# Patient Record
Sex: FEMALE | Race: WHITE | NOT HISPANIC OR LATINO | Employment: OTHER | URBAN - METROPOLITAN AREA
[De-identification: names, ages, dates, MRNs, and addresses within clinical notes are randomized per-mention and may not be internally consistent; named-entity substitution may affect disease eponyms.]

---

## 2017-05-22 DIAGNOSIS — M81.0 OSTEOPOROSIS: ICD-10-CM

## 2017-05-22 RX ORDER — ALENDRONATE SODIUM 70 MG/1
TABLET ORAL
Qty: 12 TABLET | Refills: 1 | Status: SHIPPED | OUTPATIENT
Start: 2017-05-22 | End: 2017-11-06 | Stop reason: SDUPTHER

## 2017-05-22 NOTE — TELEPHONE ENCOUNTER
----- Message from Bernadette Hunter sent at 5/22/2017  8:29 AM CDT -----  Contact: pt   Pt is requesting a call back from nurse, because she thinks she has posion ivy, pt can be reached at [phone]

## 2017-05-23 ENCOUNTER — LAB VISIT (OUTPATIENT)
Dept: LAB | Facility: HOSPITAL | Age: 67
End: 2017-05-23
Attending: FAMILY MEDICINE
Payer: MEDICARE

## 2017-05-23 ENCOUNTER — OFFICE VISIT (OUTPATIENT)
Dept: INTERNAL MEDICINE | Facility: CLINIC | Age: 67
End: 2017-05-23
Payer: MEDICARE

## 2017-05-23 VITALS
HEART RATE: 71 BPM | HEIGHT: 60 IN | TEMPERATURE: 98 F | BODY MASS INDEX: 26.79 KG/M2 | SYSTOLIC BLOOD PRESSURE: 133 MMHG | WEIGHT: 136.44 LBS | OXYGEN SATURATION: 97 % | RESPIRATION RATE: 16 BRPM | DIASTOLIC BLOOD PRESSURE: 86 MMHG

## 2017-05-23 DIAGNOSIS — M81.0 OSTEOPOROSIS, UNSPECIFIED OSTEOPOROSIS TYPE, UNSPECIFIED PATHOLOGICAL FRACTURE PRESENCE: Primary | ICD-10-CM

## 2017-05-23 DIAGNOSIS — L23.7 ALLERGIC CONTACT DERMATITIS DUE TO PLANTS, EXCEPT FOOD: ICD-10-CM

## 2017-05-23 DIAGNOSIS — Z00.00 ROUTINE HEALTH MAINTENANCE: ICD-10-CM

## 2017-05-23 PROCEDURE — 80053 COMPREHEN METABOLIC PANEL: CPT

## 2017-05-23 PROCEDURE — 99999 PR PBB SHADOW E&M-EST. PATIENT-LVL III: CPT | Mod: PBBFAC,,, | Performed by: FAMILY MEDICINE

## 2017-05-23 PROCEDURE — 36415 COLL VENOUS BLD VENIPUNCTURE: CPT | Mod: PO

## 2017-05-23 PROCEDURE — 99214 OFFICE O/P EST MOD 30 MIN: CPT | Mod: S$PBB,,, | Performed by: FAMILY MEDICINE

## 2017-05-23 NOTE — PROGRESS NOTES
Subjective:       Patient ID: Rissa Garcia is a 66 y.o. female.    Chief Complaint: Annual Exam and Poison Ivy (tomasz arms)    HPI  Here today for routine exam.  Overall she is doing well.  She has a rash on both of her forearms which she thinks is poison ivy.  She has tried different treatments including topical steroids but found that using a topical oatmeal solution has helped the best.  She feels that overall the swelling has gone down and the itching has decreased.  She is up-to-date on all health maintenance issues.  She will be due for her next DEXA scan in the fall of 2018.  She continues to take vitamin D and alendronate.    Family History   Problem Relation Age of Onset    Diabetes Mother     Osteoporosis Mother     Heart disease Father     Diabetes Brother     Sleep apnea Brother     Cataracts Maternal Aunt        Current Outpatient Prescriptions:     alendronate (FOSAMAX) 70 MG tablet, TAKE 1 TABLET ONCE WEEKLY IN THE MORNING WITH A FULL GLASS OF WATER ON AN EMPTY STOMACH. DO NOT LIE DOWN FOR AT LEAST 30 MINUTES AFTERWARDS., Disp: 12 tablet, Rfl: 1    cholecalciferol, vitamin D3, (VITAMIN D3) 1,000 unit Chew, Take 2 tabs daily, Disp: , Rfl:     omeprazole (PRILOSEC) 20 MG capsule, Take 20 mg by mouth., Disp: , Rfl:     Review of Systems   Constitutional: Negative for chills and fever.   HENT: Negative for congestion and sore throat.    Eyes: Negative for visual disturbance.   Respiratory: Negative for cough and shortness of breath.    Cardiovascular: Negative for chest pain.   Gastrointestinal: Negative for abdominal pain, constipation and diarrhea.   Endocrine: Negative for polydipsia and polyuria.   Genitourinary: Negative for difficulty urinating and menstrual problem.   Skin: Positive for rash.   Neurological: Negative for dizziness.   Hematological: Does not bruise/bleed easily.       Objective:   /86 (BP Location: Left arm, Patient Position: Sitting, BP Method: Automatic)   Pulse 71    Temp 97.8 °F (36.6 °C) (Tympanic)   Resp 16   Ht 5' (1.524 m)   Wt 61.9 kg (136 lb 7.4 oz)   SpO2 97%   BMI 26.65 kg/m²      Physical Exam   Constitutional: She is oriented to person, place, and time. She appears well-developed and well-nourished.   HENT:   Head: Normocephalic and atraumatic.   Eyes: Conjunctivae are normal.   Cardiovascular: Normal rate.    Pulmonary/Chest: Effort normal. No respiratory distress.   Musculoskeletal: She exhibits no edema.   Neurological: She is alert and oriented to person, place, and time. Coordination normal.   Skin: Skin is warm and dry. Rash (rash on bilateral forearms consistent with contact dermatitis.) noted.   Psychiatric: She has a normal mood and affect. Her behavior is normal.   Vitals reviewed.      Assessment & Plan     1. Osteoporosis, unspecified osteoporosis type, unspecified pathological fracture presence  Advised her to continue vitamin D supplementation and alendronate.  We'll reassess next year with DEXA scan.  Continue weightbearing exercise as tolerated.    2. Routine health maintenance  Overall she is up-to-date on health maintenance issues.  Getting a CMP to monitor renal function since she is taking alendronate.  - Comprehensive metabolic panel; Future    3. Contact derm:  Advised her to continue taking the same treatment that she is doing with topical oatmeal.  If symptoms worsen she should plan to follow-up

## 2017-05-24 LAB
ALBUMIN SERPL BCP-MCNC: 3.9 G/DL
ALP SERPL-CCNC: 84 U/L
ALT SERPL W/O P-5'-P-CCNC: 31 U/L
ANION GAP SERPL CALC-SCNC: 10 MMOL/L
AST SERPL-CCNC: 26 U/L
BILIRUB SERPL-MCNC: 0.2 MG/DL
BUN SERPL-MCNC: 15 MG/DL
CALCIUM SERPL-MCNC: 9.2 MG/DL
CHLORIDE SERPL-SCNC: 103 MMOL/L
CO2 SERPL-SCNC: 27 MMOL/L
CREAT SERPL-MCNC: 1 MG/DL
EST. GFR  (AFRICAN AMERICAN): >60 ML/MIN/1.73 M^2
EST. GFR  (NON AFRICAN AMERICAN): 58.4 ML/MIN/1.73 M^2
GLUCOSE SERPL-MCNC: 134 MG/DL
POTASSIUM SERPL-SCNC: 4.4 MMOL/L
PROT SERPL-MCNC: 7.6 G/DL
SODIUM SERPL-SCNC: 140 MMOL/L

## 2017-05-29 ENCOUNTER — TELEPHONE (OUTPATIENT)
Dept: INTERNAL MEDICINE | Facility: CLINIC | Age: 67
End: 2017-05-29

## 2017-05-29 DIAGNOSIS — R73.09 ELEVATED GLUCOSE: Primary | ICD-10-CM

## 2017-05-29 NOTE — TELEPHONE ENCOUNTER
----- Message from Jerome Steel DO sent at 5/29/2017  9:28 AM CDT -----  Glucose level a bit elevated. Would like to repeat testing with hemoglobin A1c. Lab ordered

## 2017-06-05 ENCOUNTER — LAB VISIT (OUTPATIENT)
Dept: LAB | Facility: HOSPITAL | Age: 67
End: 2017-06-05
Attending: INTERNAL MEDICINE
Payer: MEDICARE

## 2017-06-05 DIAGNOSIS — R73.09 ELEVATED GLUCOSE: ICD-10-CM

## 2017-06-05 PROCEDURE — 83036 HEMOGLOBIN GLYCOSYLATED A1C: CPT

## 2017-06-05 PROCEDURE — 36415 COLL VENOUS BLD VENIPUNCTURE: CPT | Mod: PO

## 2017-06-06 ENCOUNTER — TELEPHONE (OUTPATIENT)
Dept: INTERNAL MEDICINE | Facility: CLINIC | Age: 67
End: 2017-06-06

## 2017-06-06 PROBLEM — R73.03 PREDIABETES: Status: ACTIVE | Noted: 2017-06-06

## 2017-06-06 LAB
ESTIMATED AVG GLUCOSE: 134 MG/DL
HBA1C MFR BLD HPLC: 6.3 %

## 2017-06-06 NOTE — TELEPHONE ENCOUNTER
----- Message from Jerome Steel DO sent at 6/6/2017  2:27 PM CDT -----  Labs in prediabetic range.

## 2017-06-06 NOTE — TELEPHONE ENCOUNTER
Patient notified of result and express understanding. Would like to have some recommendation as to what she can do to help. Please advise

## 2017-06-08 ENCOUNTER — TELEPHONE (OUTPATIENT)
Dept: INTERNAL MEDICINE | Facility: CLINIC | Age: 67
End: 2017-06-08

## 2017-06-08 NOTE — TELEPHONE ENCOUNTER
Please mail or email her the following information:    Prediabetes    [email or web address]  Overview    What is prediabetes?    Before people develop type 2 diabetes, they usually have prediabetes. In people who have prediabetes, blood sugar levels are higher than normal but not high enough to say they have diabetes. Normal fasting blood sugar is between 70 and 99 mg per dL  . Fasting blood sugar is your blood sugar level you before you have something to eat in the morning. Fasting blood sugar between 100 and 125 mg per dL suggests prediabetes. Fasting blood sugar higher than 126 mg per dL is considered diabetes. People   who have prediabetes have a high risk of eventually developing diabetes.    Causes    Who is at risk?    You are at risk for prediabetes if any of the following are true:    You are overweight or obese.  You have a parent, brother or sister who has diabetes.  You had diabetes during pregnancy (called gestational diabetes) or had a baby who weighed more than 9 pounds at birth.  You belong to any of the following ethnic groups: , ,  or /.  You have high blood pressure (above 140/90 mm Hg).  Your high-density lipoprotein (HDL) cholesterol level (good cholesterol) is less than 40 mg per dL (for men) or less than 50 mg per dl (for women), or your triglyceride level is higher than 250 mg per dL.  You are a woman who has polycystic ovary syndrome (PCOS).  Diagnosis    How can my doctor tell if I have prediabetes?    Your doctor can give you a blood test to check for prediabetes.    Prevention    If I have prediabetes, can I avoid developing diabetes?    You can lower your risk of developing diabetes by making changes in your lifestyle. If you are overweight, losing weight can help. Losing weight also helps lower your blood pressure and cholesterol levels.    Exercise is also important. Your exercise routine should  include 30 minutes of moderate physical activity (such as brisk walking or swimming) at least 5 times a week. Ask your doctor what exercise level is safe for you.    Follow a healthy diet. Eat foods such as vegetables, fruits, whole grains, fish, beans, poultry and other meats. Dont eat a lot of processed foods or sweeteners such as sugar, honey, maple syrup, agave syrup, or molasses. Eat foods made with whole g  rains instead of white flour.    Your doctor might refer you to a dietitian or diabetes educator to help you change your eating and exercise habits.    Treatment    Can medicine help prevent or delay diabetes?    Diabetes medicines are not as effective as diet and exercise. However, your doctor might prescribe medicine if you are at high risk for diabetes and have other medical problems, such as obesity, a high triglyceride level, a low HDL cholesterol level   or high blood pressure.    Questions    Questions to Ask Your Doctor    If I have prediabetes, will I get diabetes?  What is the best step I can take to avoid getting diabetes?  My father has diabetes. Should I be screened for prediabetes on a regular basis?   I have diabetes. Should I have my children screened for prediabetes?  I had gestational diabetes. Should I be screened for prediabetes regularly?  Are there any foods I should eat that will help me to avoid prediabetes?  Should I speak with a dietitian about changing what I eat?  Other organizations    CDC Diabetes Public Health Resource  National Diabetes Education Program  American Diabetes Association  National Diabetes Information Clearinghouse  Resources    Impaired Glucose Tolerance and Impaired Fasting Glucose by Yomaira Moreira M.D., Flavio Curtis M.D., and Sydnie Ivey M.D.( 04/15/04, http://www.aafp.org/[email or web address].html)  © 2017 American Academy [employer]

## 2017-07-05 RX ORDER — OMEPRAZOLE 20 MG/1
20 CAPSULE, DELAYED RELEASE ORAL DAILY
Qty: 90 CAPSULE | Refills: 1 | Status: SHIPPED | OUTPATIENT
Start: 2017-07-05 | End: 2018-07-10 | Stop reason: SDUPTHER

## 2017-11-06 DIAGNOSIS — M81.0 OSTEOPOROSIS: ICD-10-CM

## 2017-11-07 RX ORDER — ALENDRONATE SODIUM 70 MG/1
TABLET ORAL
Qty: 12 TABLET | Refills: 1 | Status: SHIPPED | OUTPATIENT
Start: 2017-11-07 | End: 2018-04-24 | Stop reason: SDUPTHER

## 2018-01-08 ENCOUNTER — LAB VISIT (OUTPATIENT)
Dept: LAB | Facility: HOSPITAL | Age: 68
End: 2018-01-08
Attending: FAMILY MEDICINE
Payer: MEDICARE

## 2018-01-08 ENCOUNTER — OFFICE VISIT (OUTPATIENT)
Dept: INTERNAL MEDICINE | Facility: CLINIC | Age: 68
End: 2018-01-08
Payer: MEDICARE

## 2018-01-08 VITALS
OXYGEN SATURATION: 98 % | WEIGHT: 137.13 LBS | SYSTOLIC BLOOD PRESSURE: 130 MMHG | HEART RATE: 88 BPM | DIASTOLIC BLOOD PRESSURE: 90 MMHG | BODY MASS INDEX: 26.92 KG/M2 | RESPIRATION RATE: 18 BRPM | HEIGHT: 60 IN | TEMPERATURE: 97 F

## 2018-01-08 DIAGNOSIS — R73.03 PREDIABETES: Primary | ICD-10-CM

## 2018-01-08 DIAGNOSIS — R73.03 PREDIABETES: ICD-10-CM

## 2018-01-08 DIAGNOSIS — M81.0 OSTEOPOROSIS, UNSPECIFIED OSTEOPOROSIS TYPE, UNSPECIFIED PATHOLOGICAL FRACTURE PRESENCE: ICD-10-CM

## 2018-01-08 DIAGNOSIS — J32.9 RHINOSINUSITIS: ICD-10-CM

## 2018-01-08 DIAGNOSIS — Z12.11 COLON CANCER SCREENING: ICD-10-CM

## 2018-01-08 LAB
ANION GAP SERPL CALC-SCNC: 9 MMOL/L
BUN SERPL-MCNC: 14 MG/DL
CALCIUM SERPL-MCNC: 9.1 MG/DL
CHLORIDE SERPL-SCNC: 106 MMOL/L
CO2 SERPL-SCNC: 27 MMOL/L
CREAT SERPL-MCNC: 0.9 MG/DL
EST. GFR  (AFRICAN AMERICAN): >60 ML/MIN/1.73 M^2
EST. GFR  (NON AFRICAN AMERICAN): >60 ML/MIN/1.73 M^2
GLUCOSE SERPL-MCNC: 124 MG/DL
POTASSIUM SERPL-SCNC: 3.9 MMOL/L
SODIUM SERPL-SCNC: 142 MMOL/L

## 2018-01-08 PROCEDURE — 99213 OFFICE O/P EST LOW 20 MIN: CPT | Mod: PBBFAC,PO | Performed by: FAMILY MEDICINE

## 2018-01-08 PROCEDURE — 99999 PR PBB SHADOW E&M-EST. PATIENT-LVL III: CPT | Mod: PBBFAC,,, | Performed by: FAMILY MEDICINE

## 2018-01-08 PROCEDURE — 36415 COLL VENOUS BLD VENIPUNCTURE: CPT | Mod: PO

## 2018-01-08 PROCEDURE — 99214 OFFICE O/P EST MOD 30 MIN: CPT | Mod: S$PBB,,, | Performed by: FAMILY MEDICINE

## 2018-01-08 PROCEDURE — 83036 HEMOGLOBIN GLYCOSYLATED A1C: CPT

## 2018-01-08 PROCEDURE — 80048 BASIC METABOLIC PNL TOTAL CA: CPT | Mod: PO

## 2018-01-08 NOTE — ASSESSMENT & PLAN NOTE
Last hemoglobin A1c was about 6.3 in June 2016.  If levels are the same or more elevated would recommend starting on metformin.

## 2018-01-08 NOTE — ASSESSMENT & PLAN NOTE
Recommended using cetirizine and Flonase on a daily basis as well as eustachian tube exercises.  If symptoms are not improved towards the end of the week would recommend a Medrol Dosepak

## 2018-01-08 NOTE — PROGRESS NOTES
Subjective:       Patient ID: Rissa Garcia is a 67 y.o. female.    Chief Complaint: Nasal Congestion; Ear Fullness (right); and Cough    HPI  Here today complaining of right-sided ear congestion without any pain.  She started having these symptoms about a week ago prior to that she did have significant upper respiratory type infection for a couple weeks those surgery with a lot of sneezing and some coughing.  She has not taken any ALLERGY tablets on a consistent basis and is not using Flonase.  She has tried some over-the-counter nasal spray which helps somewhat with her nasal congestion but has not helped with the congestion.  She does not have any drainage from ears.  Does have hearing nasal both sides.    We also discussed some health maintenance today.  She will be due for follow-up mammogram later this year and also bone density screening.  She continues to take alendronate as well as calcium and vitamin D.    she has found herself to be somewhat dependent on taking omeprazole on a routine basis.  She takes it every other day and she has noticed that if she doesn't take it for a few days she will start having symptoms.  She has never had an upper endoscopy.  Has also never had colonoscopy but in the past has done fecal occult blood testing.    Family History   Problem Relation Age of Onset    Diabetes Mother     Osteoporosis Mother     Heart disease Father     Diabetes Brother     Sleep apnea Brother     Cataracts Maternal Aunt        Current Outpatient Prescriptions:     alendronate (FOSAMAX) 70 MG tablet, TAKE 1 TABLET ONCE WEEKLY IN THE MORNING WITH A FULL GLASS OF WATER ON AN EMPTY STOMACH. DO NOT LIE DOWN FOR AT LEAST 30 MINUTES AFTERWARDS., Disp: 12 tablet, Rfl: 1    cholecalciferol, vitamin D3, (VITAMIN D3) 1,000 unit Chew, Take 2 tabs daily, Disp: , Rfl:     omeprazole (PRILOSEC) 20 MG capsule, Take 1 capsule (20 mg total) by mouth once daily., Disp: 90 capsule, Rfl: 1    Review of Systems    Constitutional: Negative for chills and fever.   HENT: Positive for ear pain (no ear pain but having congestion on the right side.). Negative for congestion.    Respiratory: Negative for cough and shortness of breath.    Cardiovascular: Negative for chest pain.   Gastrointestinal: Negative for abdominal pain.   Skin: Negative for rash.   Neurological: Negative for dizziness.       Objective:   BP (!) 130/90 (BP Location: Right arm, Patient Position: Sitting, BP Method: Large (Manual))   Pulse 88   Temp 96.7 °F (35.9 °C) (Tympanic)   Resp 18   Ht 5' (1.524 m)   Wt 62.2 kg (137 lb 2 oz)   SpO2 98%   BMI 26.78 kg/m²      Physical Exam   Constitutional: She is oriented to person, place, and time. She appears well-developed and well-nourished.   HENT:   Head: Normocephalic and atraumatic.   Does seem to have fluid of her right middle ear.  There is some cerumen but it is not impacted.  More cerumen on the left side.  No signs of erythema of tympanic membrane   Eyes: Conjunctivae are normal.   Cardiovascular: Normal rate.    Pulmonary/Chest: Effort normal. No respiratory distress.   Musculoskeletal: She exhibits no edema.   Neurological: She is alert and oriented to person, place, and time. Coordination normal.   Skin: Skin is warm and dry. No rash noted.   Psychiatric: She has a normal mood and affect. Her behavior is normal.   Vitals reviewed.      Assessment & Plan     Problem List Items Addressed This Visit        ENT    Rhinosinusitis    Current Assessment & Plan     Recommended using cetirizine and Flonase on a daily basis as well as eustachian tube exercises.  If symptoms are not improved towards the end of the week would recommend a Medrol Dosepak            Endocrine    Prediabetes - Primary    Current Assessment & Plan     Last hemoglobin A1c was about 6.3 in June 2016.  If levels are the same or more elevated would recommend starting on metformin.         Relevant Orders    Hemoglobin A1c    Basic  metabolic panel       GI    Colon cancer screening    Current Assessment & Plan     Opted to do fit         Relevant Orders    Fecal Immunochemical Test (iFOBT)       Orthopedic    Osteoporosis    Current Assessment & Plan     Will be due for DEXA scan later this year.  She continues to take alendronate.                 No Follow-up on file.

## 2018-01-09 LAB
ESTIMATED AVG GLUCOSE: 120 MG/DL
HBA1C MFR BLD HPLC: 5.8 %

## 2018-01-11 ENCOUNTER — APPOINTMENT (OUTPATIENT)
Dept: LAB | Facility: HOSPITAL | Age: 68
End: 2018-01-11
Attending: FAMILY MEDICINE
Payer: MEDICARE

## 2018-01-11 PROCEDURE — 82274 ASSAY TEST FOR BLOOD FECAL: CPT

## 2018-03-05 ENCOUNTER — OFFICE VISIT (OUTPATIENT)
Dept: INTERNAL MEDICINE | Facility: CLINIC | Age: 68
End: 2018-03-05
Payer: MEDICARE

## 2018-03-05 VITALS
DIASTOLIC BLOOD PRESSURE: 86 MMHG | OXYGEN SATURATION: 98 % | HEIGHT: 60 IN | BODY MASS INDEX: 26.84 KG/M2 | RESPIRATION RATE: 16 BRPM | HEART RATE: 73 BPM | WEIGHT: 136.69 LBS | SYSTOLIC BLOOD PRESSURE: 120 MMHG | TEMPERATURE: 97 F

## 2018-03-05 DIAGNOSIS — L30.9 ECZEMA, UNSPECIFIED TYPE: Primary | ICD-10-CM

## 2018-03-05 PROBLEM — J32.9 RHINOSINUSITIS: Status: RESOLVED | Noted: 2018-01-08 | Resolved: 2018-03-05

## 2018-03-05 PROCEDURE — 99999 PR PBB SHADOW E&M-EST. PATIENT-LVL IV: CPT | Mod: PBBFAC,,, | Performed by: NURSE PRACTITIONER

## 2018-03-05 PROCEDURE — 99214 OFFICE O/P EST MOD 30 MIN: CPT | Mod: S$PBB,,, | Performed by: NURSE PRACTITIONER

## 2018-03-05 PROCEDURE — 99214 OFFICE O/P EST MOD 30 MIN: CPT | Mod: PBBFAC,PO | Performed by: NURSE PRACTITIONER

## 2018-03-05 RX ORDER — FLUOCINONIDE 0.5 MG/G
CREAM TOPICAL 2 TIMES DAILY
COMMUNITY
End: 2018-03-05 | Stop reason: SDUPTHER

## 2018-03-05 RX ORDER — FLUOCINONIDE 0.5 MG/G
CREAM TOPICAL 2 TIMES DAILY
Qty: 120 G | Refills: 1 | Status: SHIPPED | OUTPATIENT
Start: 2018-03-05 | End: 2020-08-25 | Stop reason: SDUPTHER

## 2018-03-05 RX ORDER — TRIAMCINOLONE ACETONIDE 1 MG/G
CREAM TOPICAL 2 TIMES DAILY
COMMUNITY
End: 2020-08-25 | Stop reason: ALTCHOICE

## 2018-03-05 NOTE — PROGRESS NOTES
Subjective:       Patient ID: Rissa Garcia is a 67 y.o. female.     Chief Complaint: Rash (itching)     Rash   This is a chronic (usually occurs at the same time each year) problem. The current episode started more than 1 year ago (20 years ago). The problem has been waxing and waning since onset. The affected locations include the left hand, left wrist, right hand and right wrist. The rash is characterized by blistering, pain, itchiness and swelling. Pertinent negatives include no congestion, cough, fatigue, fever, joint pain, rhinorrhea, shortness of breath, sore throat or vomiting. Past treatments include anti-itch cream, antibiotic cream and topical steroids. The treatment provided mild (helps for a few hours then needs to re-apply) relief. Her past medical history is significant for eczema and varicella. There is no history of allergies or asthma.      Review of Systems   Constitutional: Negative for fatigue and fever.   HENT: Negative for congestion, rhinorrhea and sore throat.    Eyes: Negative.    Respiratory: Negative for cough and shortness of breath.    Cardiovascular: Negative for chest pain, palpitations and leg swelling.   Gastrointestinal: Negative.  Negative for vomiting.   Musculoskeletal: Negative for arthralgias, joint pain, joint swelling and myalgias.   Skin: Positive for rash.   Neurological: Negative.    Psychiatric/Behavioral: Negative.        Objective:   Physical Exam   Constitutional: She is oriented to person, place, and time. She appears well-developed and well-nourished.   HENT:   Head: Normocephalic.   Eyes: EOM are normal. Pupils are equal, round, and reactive to light.   Neck: Normal range of motion.   Cardiovascular: Normal rate, regular rhythm and normal heart sounds.    Pulmonary/Chest: Effort normal and breath sounds normal.   Abdominal: Soft.   Musculoskeletal: Normal range of motion.   Neurological: She is alert and oriented to person, place, and time.   Skin: Skin is warm and  dry. Rash noted. Rash is papular.   Small areas of raised, reddened bumps over both hands, and between fingers   Psychiatric: She has a normal mood and affect. Her behavior is normal. Judgment and thought content normal.       Assessment:       1. Eczema, unspecified type        Plan:           Problem List Items Addressed This Visit      Eczema - Primary     Current Assessment & Plan       Avoid over drying hands with soap. Discard  steroid cream  If not improving over next 10 days will refer to derm           Relevant Medications     fluocinonide 0.05% (LIDEX) 0.05 % cream

## 2018-03-05 NOTE — ASSESSMENT & PLAN NOTE
Avoid over drying hands with soap. Discard  steroid cream  If not improving over next 10 days will refer to derm

## 2018-04-24 DIAGNOSIS — M81.0 OSTEOPOROSIS: ICD-10-CM

## 2018-04-24 RX ORDER — ALENDRONATE SODIUM 70 MG/1
TABLET ORAL
Qty: 12 TABLET | Refills: 1 | Status: SHIPPED | OUTPATIENT
Start: 2018-04-24 | End: 2018-11-30 | Stop reason: SDUPTHER

## 2018-07-10 RX ORDER — OMEPRAZOLE 20 MG/1
20 CAPSULE, DELAYED RELEASE ORAL DAILY
Qty: 90 CAPSULE | Refills: 1 | Status: SHIPPED | OUTPATIENT
Start: 2018-07-10 | End: 2019-01-16 | Stop reason: SDUPTHER

## 2018-09-11 ENCOUNTER — OFFICE VISIT (OUTPATIENT)
Dept: INTERNAL MEDICINE | Facility: CLINIC | Age: 68
End: 2018-09-11
Payer: MEDICARE

## 2018-09-11 ENCOUNTER — HOSPITAL ENCOUNTER (OUTPATIENT)
Dept: RADIOLOGY | Facility: HOSPITAL | Age: 68
Discharge: HOME | End: 2018-09-11
Attending: FAMILY MEDICINE
Payer: MEDICARE

## 2018-09-11 VITALS
SYSTOLIC BLOOD PRESSURE: 128 MMHG | DIASTOLIC BLOOD PRESSURE: 74 MMHG | BODY MASS INDEX: 26.32 KG/M2 | WEIGHT: 134.06 LBS | HEART RATE: 80 BPM | RESPIRATION RATE: 18 BRPM | TEMPERATURE: 97 F | HEIGHT: 60 IN | OXYGEN SATURATION: 98 %

## 2018-09-11 DIAGNOSIS — S16.1XXA STRAIN OF NECK MUSCLE, INITIAL ENCOUNTER: ICD-10-CM

## 2018-09-11 DIAGNOSIS — Z12.31 ENCOUNTER FOR SCREENING MAMMOGRAM FOR BREAST CANCER: ICD-10-CM

## 2018-09-11 DIAGNOSIS — K21.9 GASTROESOPHAGEAL REFLUX DISEASE, ESOPHAGITIS PRESENCE NOT SPECIFIED: ICD-10-CM

## 2018-09-11 DIAGNOSIS — M81.0 OSTEOPOROSIS, UNSPECIFIED OSTEOPOROSIS TYPE, UNSPECIFIED PATHOLOGICAL FRACTURE PRESENCE: Primary | ICD-10-CM

## 2018-09-11 PROCEDURE — 77067 SCR MAMMO BI INCL CAD: CPT | Mod: 26,,, | Performed by: RADIOLOGY

## 2018-09-11 PROCEDURE — 99215 OFFICE O/P EST HI 40 MIN: CPT | Mod: PBBFAC,PO | Performed by: FAMILY MEDICINE

## 2018-09-11 PROCEDURE — 99214 OFFICE O/P EST MOD 30 MIN: CPT | Mod: S$PBB,,, | Performed by: FAMILY MEDICINE

## 2018-09-11 PROCEDURE — 77067 SCR MAMMO BI INCL CAD: CPT | Mod: TC,PO

## 2018-09-11 PROCEDURE — 99999 PR PBB SHADOW E&M-EST. PATIENT-LVL V: CPT | Mod: PBBFAC,,, | Performed by: FAMILY MEDICINE

## 2018-09-11 PROCEDURE — 77063 BREAST TOMOSYNTHESIS BI: CPT | Mod: 26,,, | Performed by: RADIOLOGY

## 2018-09-11 NOTE — ASSESSMENT & PLAN NOTE
Recommended minimizing the use of omeprazole as best she can.  We discussed the potential issues with osteoporosis and calcium loss with this.

## 2018-09-11 NOTE — ASSESSMENT & PLAN NOTE
Recommended continue doing stretching and range-of-motion exercises.  May use Aleve a couple times a day for the next 4-5 days to see if this helps.

## 2018-09-11 NOTE — MEDICAL/APP STUDENT
Subjective:       Patient ID: Rissa Garcia is a 68 y.o. female.    Chief Complaint: Neck Pain    Neck Pain    This is a new problem. The current episode started in the past 7 days (Wednesday). The problem occurs intermittently. The problem has been waxing and waning (resolved yesterday, returned this morning). Associated with: cutting limbs and working in yard. The pain is present in the left side and right side (left side with turning head, R side not related to movement). The quality of the pain is described as aching. The pain is at a severity of 5/10. The pain is moderate. The symptoms are aggravated by position (leaning forward). Pertinent negatives include no headaches, numbness, tingling or weakness. She has tried heat, ice, NSAIDs and acetaminophen for the symptoms. The treatment provided mild relief.       Family History   Problem Relation Age of Onset    Diabetes Mother     Osteoporosis Mother     Heart disease Father     Diabetes Brother     Sleep apnea Brother     Cataracts Maternal Aunt        Current Outpatient Medications:     alendronate (FOSAMAX) 70 MG tablet, TAKE 1 TABLET ONCE WEEKLY IN THE MORNING WITH A FULL GLASS OF WATER ON AN EMPTY STOMACH. DO NOT LIE DOWN FOR AT LEAST 30 MINUTES AFTERWARDS., Disp: 12 tablet, Rfl: 1    cholecalciferol, vitamin D3, (VITAMIN D3) 1,000 unit Chew, Take 2 tabs daily, Disp: , Rfl:     fluocinonide 0.05% (LIDEX) 0.05 % cream, Apply topically 2 (two) times daily., Disp: 120 g, Rfl: 1    omeprazole (PRILOSEC) 20 MG capsule, Take 1 capsule (20 mg total) by mouth once daily., Disp: 90 capsule, Rfl: 1    triamcinolone acetonide 0.1% (KENALOG) 0.1 % cream, Apply topically 2 (two) times daily., Disp: , Rfl:     Review of Systems   Respiratory: Negative.    Cardiovascular: Negative.    Musculoskeletal: Positive for neck pain. Negative for gait problem and neck stiffness.        Pain does not radiate. R trapezius aching.    Neurological: Negative for tingling,  weakness, numbness and headaches.       Objective:   /74 (BP Location: Right arm, Patient Position: Sitting, BP Method: Medium (Automatic))   Pulse 80   Temp 96.6 °F (35.9 °C) (Tympanic)   Resp 18   Ht 5' (1.524 m)   Wt 60.8 kg (134 lb 0.6 oz)   SpO2 98%   BMI 26.18 kg/m²      Physical Exam   Constitutional: She appears well-developed and well-nourished. No distress.   Neck: Normal range of motion.   Negative for tenderness with palpation.   Cardiovascular: Normal rate and regular rhythm.   Skin: She is not diaphoretic.       Assessment & Plan     Problem List Items Addressed This Visit     Osteoporosis - Primary            No Follow-up on file.

## 2018-09-11 NOTE — PROGRESS NOTES
Subjective:       Patient ID: Rissa Garcia is a 68 y.o. female.    Chief Complaint: Neck Pain    HPI  Here today with right-sided neck pain and tightness.  One week ago she was working with a chainsaw doing a lot overhead work in since that time has been having pain, intermittent spasms of her right trapezius which is on top of a chronic problem that she has had for many years where she will intermittently have a spasm of her right trapezius causing her shoulder to elevated toward her ear.  She does not have any radiation of pain down her arms and never has had that problem.  For this acute episode since cutting a lot of limbs she has been using some Tylenol and naproxen intermittently without much relief.  Also has tried heat and cold compresses which also have not helped very much.    Family History   Problem Relation Age of Onset    Diabetes Mother     Osteoporosis Mother     Heart disease Father     Diabetes Brother     Sleep apnea Brother     Cataracts Maternal Aunt        Current Outpatient Medications:     alendronate (FOSAMAX) 70 MG tablet, TAKE 1 TABLET ONCE WEEKLY IN THE MORNING WITH A FULL GLASS OF WATER ON AN EMPTY STOMACH. DO NOT LIE DOWN FOR AT LEAST 30 MINUTES AFTERWARDS., Disp: 12 tablet, Rfl: 1    cholecalciferol, vitamin D3, (VITAMIN D3) 1,000 unit Chew, Take 2 tabs daily, Disp: , Rfl:     fluocinonide 0.05% (LIDEX) 0.05 % cream, Apply topically 2 (two) times daily., Disp: 120 g, Rfl: 1    omeprazole (PRILOSEC) 20 MG capsule, Take 1 capsule (20 mg total) by mouth once daily., Disp: 90 capsule, Rfl: 1    triamcinolone acetonide 0.1% (KENALOG) 0.1 % cream, Apply topically 2 (two) times daily., Disp: , Rfl:     Review of Systems   Constitutional: Negative for chills and fever.   Respiratory: Negative for cough and shortness of breath.    Cardiovascular: Negative for chest pain.   Gastrointestinal: Negative for abdominal pain.   Musculoskeletal: Positive for neck pain.   Skin: Negative for  rash.   Neurological: Negative for dizziness.       Objective:   /74 (BP Location: Right arm, Patient Position: Sitting, BP Method: Medium (Automatic))   Pulse 80   Temp 96.6 °F (35.9 °C) (Tympanic)   Resp 18   Ht 5' (1.524 m)   Wt 60.8 kg (134 lb 0.6 oz)   SpO2 98%   BMI 26.18 kg/m²      Physical Exam   Constitutional: She is oriented to person, place, and time. She appears well-developed and well-nourished.   HENT:   Head: Normocephalic and atraumatic.   Eyes: Conjunctivae are normal.   Cardiovascular: Normal rate.   Pulmonary/Chest: Effort normal. No respiratory distress.   Musculoskeletal: She exhibits no edema.   Decreased range of motion of her cervical spine.  Had some tenderness of the trapezius muscle.   Neurological: She is alert and oriented to person, place, and time. Coordination normal.   Skin: Skin is warm and dry. No rash noted.   Psychiatric: She has a normal mood and affect. Her behavior is normal.   Vitals reviewed.      Assessment & Plan     Problem List Items Addressed This Visit        Renal/    Encounter for screening mammogram for breast cancer    Current Assessment & Plan     Due for repeat mammogram.         Relevant Orders    Mammo Digital Screening Bilat with CAD       GI    Acid reflux    Current Assessment & Plan     Recommended minimizing the use of omeprazole as best she can.  We discussed the potential issues with osteoporosis and calcium loss with this.            Orthopedic    Osteoporosis - Primary    Current Assessment & Plan     Recommend that she continues on the alendronate for now.  Due for repeat DEXA scan.         Relevant Orders    DXA Bone Density Spine And Hip    Cervical strain    Current Assessment & Plan     Recommended continue doing stretching and range-of-motion exercises.  May use Aleve a couple times a day for the next 4-5 days to see if this helps.         Relevant Orders    Ambulatory Referral to Physiatry        She declined flu shot  today.    No Follow-up on file.

## 2018-10-03 ENCOUNTER — APPOINTMENT (OUTPATIENT)
Dept: RADIOLOGY | Facility: HOSPITAL | Age: 68
End: 2018-10-03
Attending: FAMILY MEDICINE
Payer: MEDICARE

## 2018-10-03 ENCOUNTER — INITIAL CONSULT (OUTPATIENT)
Dept: PHYSICAL MEDICINE AND REHAB | Facility: CLINIC | Age: 68
End: 2018-10-03
Payer: MEDICARE

## 2018-10-03 VITALS
DIASTOLIC BLOOD PRESSURE: 87 MMHG | HEIGHT: 60 IN | BODY MASS INDEX: 26.31 KG/M2 | SYSTOLIC BLOOD PRESSURE: 156 MMHG | WEIGHT: 134 LBS | HEART RATE: 67 BPM | RESPIRATION RATE: 14 BRPM

## 2018-10-03 DIAGNOSIS — R29.898 ARM WEAKNESS: ICD-10-CM

## 2018-10-03 DIAGNOSIS — M81.0 OSTEOPOROSIS, UNSPECIFIED OSTEOPOROSIS TYPE, UNSPECIFIED PATHOLOGICAL FRACTURE PRESENCE: ICD-10-CM

## 2018-10-03 DIAGNOSIS — M79.18 MYOFASCIAL PAIN: Primary | ICD-10-CM

## 2018-10-03 PROCEDURE — 99204 OFFICE O/P NEW MOD 45 MIN: CPT | Mod: S$PBB,,, | Performed by: PHYSICAL MEDICINE & REHABILITATION

## 2018-10-03 PROCEDURE — 99999 PR PBB SHADOW E&M-EST. PATIENT-LVL III: CPT | Mod: PBBFAC,,, | Performed by: PHYSICAL MEDICINE & REHABILITATION

## 2018-10-03 PROCEDURE — 77080 DXA BONE DENSITY AXIAL: CPT | Mod: 26,,, | Performed by: RADIOLOGY

## 2018-10-03 PROCEDURE — 77080 DXA BONE DENSITY AXIAL: CPT | Mod: TC,PO

## 2018-10-03 PROCEDURE — 99213 OFFICE O/P EST LOW 20 MIN: CPT | Mod: PBBFAC,25,PO | Performed by: PHYSICAL MEDICINE & REHABILITATION

## 2018-10-03 RX ORDER — FLUOCINONIDE CREAM (EMULSIFIED BASE) 0.5 MG/G
CREAM TOPICAL
COMMUNITY
Start: 2014-02-04

## 2018-10-03 NOTE — PATIENT INSTRUCTIONS
Myofascial Pain Syndrome: Fibrositis  Your pain is caused by a state of chronic muscle tension. This condition is called by various names: myofascial pain, fibrositis and trigger point pain. This can also be due to mechanical stress (such as working at a computer terminal for long periods; or work that requires repetitive motions of the arms or hands) or emotional stress (such as problems on the job or in your personal life). Sometimes there is no obvious cause. The pain can occur in the area of the muscle spasm or at a site distant to it. For example, spasm of a neck muscle can cause headache. Spasm of the muscle near the shoulder blade can cause pain shooting down the arm.  Home Care:  · Try to identify the factors that may be causing your problem and change them:  ¨ If you feel that emotional stress is a cause of your pain, learn methods to deal more effectively with the stress in your life. These may include regular exercise, muscle relaxation techniques, meditation or simply taking time out for yourself. Consult your doctor or go to a local bookstore and review the many books and tapes available on the subject of stress reduction.  ¨ If you feel that physical stress is a cause for your pain, try to modify any poor work habits.  · You may use acetaminophen (Tylenol) or ibuprofen (Motrin, Advil) to control pain, unless another medicine was prescribed. [NOTE: If you have chronic liver or kidney disease or ever had a stomach ulcer or GI bleeding, talk with your doctor before using these medicines.]  · The use of heat to the muscle (hot compress or heating pad) will be helpful to reduce muscle spasm. Some persons get relief with ice packs. Apply an ice pack (crushed or cubed ice in a plastic bag, wrapped in a towel) for 20 minutes at a time as needed. Use the method that feels best to you.  · Massaging the trigger point and stretching out the muscle are an important parts of prevention and treatment. Trigger point  massage can be done by first applying heat to the area to warm and prepare the muscle. Have someone apply steady thumb pressure directly on the knot in the muscle (the most tender point) for 30 seconds. Release the pressure, then massage the surrounding muscle. Repeat the process, applying more pressure to the trigger point each time. Do this up to the limit of pain. With each treatment, the trigger point should become less tender and the pain should decrease. You can apply local pressure to trigger points in the back by lying on the floor with a tennis ball under the trigger point.  Follow Up  with your doctor as advised or if not improving within the next week. It may be necessary for you to receive physical therapy if you do not respond to home treatment alone.  Get Prompt Medical Attention  if any of the following occur:  · If your trigger point is in the chest muscles, observe for pain that becomes more severe, lasts longer, or spreads into your shoulder/arm, neck or back; you develop trouble breathing, sweating, nausea or vomiting in association with chest pain  · If you develop weakness or numbness in an extremity  · If your pain worsens, regardless of its location  © 2090-6544 [employer]. [address] [place] [address]. All rights reserved. This information is not intended as a substitute for professional medical care. Always follow your healthcare professional's instructions.          Trigger Point Injection  The cause of your muscle pain or spasms may be one or more trigger points. Your health care provider may decide to inject the painful spots to relax the muscle. This can help relieve your pain. Relaxing the muscle can also make movement easier. You may then be able to exercise to strengthen the muscle and help it heal.    What is a trigger point?  A trigger point is a tight, painful knot of muscle fiber. It can form where a muscle is strained or injured. The knot can  sometimes be felt under the skin. A trigger point is very tender to the touch. Pain may also spread to other parts of the affected muscle. Muscles around a knee, shoulder blade, or other bones are prone to trigger points. This is because these muscles are more likely to be injured.    About the injections  Any muscle in the body can have one or more trigger points. Several injections may be needed in each trigger point to best relieve pain. These injections may be given in sessions about 2 weeks apart, depending on the preference of your health care provider. In some cases, you may not feel much change in your symptoms until after the third injection.     © 0110-8658 [email or web address]. [address] [place] [address]. All rights reserved. This information is not intended as a substitute for professional medical care. Always follow your healthcare professional's instructions.            Your Neck Muscles  The muscles in the neck and shoulders need to be strong to hold the neck and head in place. These muscles also help move the neck and shoulders. Your health care provider can recommend exercises to help stretch and strengthen your neck muscles.    © 8241-5487 [email or web address]. [address] [place] [address]. All rights reserved. This information is not intended as a substitute for professional medical care. Always follow your healthcare professional's instructions.          Neck Problems: Relieving Your Symptoms  The first goal of treatment is to relieve your symptoms. Your health care provider may recommend self-care treatments. These include resting, applying ice and heat, taking medication, and doing exercises. Your health care provider may also recommend that you see a physical therapist, who can teach you ways to care for and strengthen your neck.    Self-Care Treatments  Pain can end quickly or last awhile. Either way, youll want relief as soon as possible. Your health  care provider can tell you which treatments to do at home to help relieve your pain.  · Lying down for a short time takes pressure from the head off the neck.  · Ice and heat can help reduce pain. To bring down swelling, rest an ice pack wrapped in a thin towel on your neck for 15 minutes. To relax sore muscles, apply a warm, wet towel to the area. Or take a warm bath or shower.  · Over-the-counter medications, such as ibuprofen, naproxen, and aspirin, can help reduce pain and swelling. Acetaminophen can help relieve pain. Use these only as directed.  · Exercises can relax muscles and prevent stiffness. To prepare, drape a warm, wet towel around your neck and shoulders for 5 minutes. Remove the towel. Then do any exercises recommended to you by your health care provider.  Physical Therapy  If self-care treatments arent helping relieve neck pain, your health care provider may suggest one or more sessions of physical therapy. Physical therapy is performed by a specialist trained to treat injuries. Your physical therapist (PT) will teach you how to strengthen muscles, improve the spines alignment, and help you move properly. Treatment methods used in physical therapy may include:  · Heat. A special heating pad called a neck pack may be applied to your neck.  · Exercises. Your PT will teach you exercises to help strengthen your neck and improve its range of motion.  · Joint mobilization. The PT gently moves your vertebrae to help restore motion in your neck joints and reduce neck pain.  · Soft tissue mobilization. The PT massages and stretches the muscles in your neck and shoulders.  · Electrical stimulation. Electrical impulses are sent into your neck. This helps reduce soreness and inflammation.  · Education in body mechanics. The PT shows you ways to position and move your body that protect the neck.  Other Treatments  If physical therapy doesnt relieve your neck pain, your health care provider may suggest other  treatments. For example, medications or injections can help relieve pain and swelling. In some cases, surgery may be needed to treat neck problems.  © 2722-6589 [employer]. [address] [place] [address], [place] [address]. All rights reserved. This information is not intended as a substitute for professional medical care. Always follow your healthcare professional's instructions.          Understanding Neck Problems       If you suffer from neck pain, youre not alone. Many people have neck pain at some point in their lives. Problems such as poor posture, injury, and wear and tear can lead to neck pain. Your health care provider will work with you to find the treatment thats best for your neck.  Types of Neck Problems  The following problems can cause pain or injury in your neck:  · Strains and sprains: Strains (stretched or torn muscles) and sprains (stretched or torn ligaments) can cause neck pain. Strains and sprains can occur during an accident, or when you overuse your neck through repetitive motion. They can also cause your muscles and ligaments to become inflamed (swollen and painful).  · Whiplash and other injuries: Whiplash can result when an impact throws your head, forcing your neck too far forward (hyperflexion), then too far backward (hyperextension). When combined, the two motions can cause a painful injury to different parts of your neck, such as muscles, ligaments, or joints. The most common cause of whiplash is a car accident. But it can also happen during a fall or sports injury.  · Weakened disks: A simple action, such as a sneeze or a cough, can cause one of your disks to bulge (herniate). A herniated disk can put pressure on your nerve and cause pain. Over time, disks can also thin out (degenerate). Flattened disks dont cushion vertebrae well and can cause vertebrae to rub together. Rubbing vertebrae can pinch nerves and cause pain.  · Weakened joints: Aging and injury can cause joints  to slowly degenerate. Thinned joints can also cause vertebrae to rub together. This can cause abnormal growths of bone (bone spurs) to form on vertebrae. Bone spurs put pressure on nerves, causing pain.  Common Symptoms  If you have a neck problem, you may have one or more of the following symptoms:  · Muscle tension and spasm: You may not be able to move your neck, arms, or shoulders comfortably if you have muscle tension or stiffness in your neck. If your symptoms arent relieved, you may experience muscle spasms, or knots of contracted tissue (trigger points) in areas of your neck and shoulders.  · Aches and pains: Dull aches in your head or neck, sharp pains, and swelling of the soft tissue of your neck and shoulders are common symptoms. If theres pressure on the nerves in your neck, you may feel pain in your arms or hands (referred pain).  · Numbness or weakness: If you injure the nerves in your neck, you may experience numbness, tingling, or weakness in your shoulders, arms, or hands. These symptoms arise when disks or bone spurs press on the nerves in your neck.  © 2556-3413 [employer]. [address] [place] [address]. All rights reserved. This information is not intended as a substitute for professional medical care. Always follow your healthcare professional's instructions.          Neck Spasm [No Trauma]    Spasm of the neck muscles can occur after a sudden awkward neck movement. Sleeping with your neck in a crooked position can also cause spasm. Some persons respond to emotional stress by tensing the muscles of their neck, shoulders and upper back. If neck spasm lasts long enough, it can cause headache.  The treatment described below will usually help the pain to go away in 5-7 days. Pain that continues may require further evaluation or other types of treatment such as physical therapy.  Home Care:  1. Rest and relax the muscles. Use a comfortable pillow that supports the head  and keeps the spine in a neutral position. The position of the head should not be tilted forward or backward. A rolled up towel may help for a custom fit.  2. Some persons find relief with heat (hot shower, hot bath or heating pad) and massage, while others prefer cold packs (crushed or cubed ice in a plastic bag, wrapped in a towel). Try both and use the method that feels best for 20 minutes several times a day.  3. You may use acetaminophen (Tylenol) or ibuprofen (Motrin, Advil) to control pain, unless another medicine was prescribed. [NOTE: If you have chronic liver or kidney disease or ever had a stomach ulcer or GI bleeding, talk with your doctor before using these medicines.]  Follow Up  with your doctor or this facility if your symptoms do not show signs of improvement after one week. Physical therapy or further evaluation may be needed.  [NOTE: If x-rays were taken, they will be reviewed by a radiologist. You will be notified of any new findings that may affect your care.]  Return Promptly  or contact your doctor if any of the following occurs:  · Pain becomes worse or spreads into one or both arms  · Weakness or numbness in one or both arms  · Increasing headache with nausea or vomiting  · Fever over 100.4ºF (38.0ºC)  © 5642-4755 [employer]. [address] [place] [address], [place] [address]. All rights reserved. This information is not intended as a substitute for professional medical care. Always follow your healthcare professional's instructions.          Know Your Neck: The Cervical Spine  By learning about the parts of the neck, you can better understand your neck problem. The bones of the neck are called cervical vertebrae, commonly identified as C1 through C7. Together, they form a bony column called the spine. Vertebrae also protect the spinal cord, a pathway for messages to reach the brain. Surrounding the spine are soft tissues such as muscles, tendons, and nerves.        Flexibility Is  Key  For the neck to function normally, it has to be flexible enough to move without discomfort. A healthy neck can move easily in six different directions.    © 7170-5989 The [employer]. [address] [place] [address], [place] [address]. All rights reserved. This information is not intended as a substitute for professional medical care. Always follow your healthcare professional's instructions.          Protecting Your Neck: Posture and Body Mechanics  Protecting your neck from injuries and pain involves practicing good posture and body mechanics. This may mean correcting bad habits you have related to the way you hold and move your body. The tips below can help you improve your posture and body mechanics.    What Is Posture and Why Does It Matter?  Posture is the way you hold your body. For many of us, this means hunching over, thrusting the chin forward, and slouching the shoulders. But this kind of poor posture keeps muscles from properly supporting the neck and puts stress on muscles, disks, ligaments, and joints in your neck. As a result, injury and pain can occur.  How Is Your Posture?  Use a full-length mirror to check your posture. To begin, stand normally. Then slowly back up against a wall. Is there space between your head and the wall? Do you slouch your shoulders? Is your chin pointing up or down? All these can cause neck pain and injury.  Improving Your Posture  Follow these steps to improve your posture:  · Pull your shoulders back.  · Think of the ears, shoulders, and hips as a series of dots. Now, adjust your body to connect the dots in a straight line.  · Keep your chin level.  What Are Body Mechanics and Why Do They Matter?  The way you move and position your body during daily activities is called body mechanics. Good body mechanics help protect the neck. This means learning the right ways to stand, sit, and even sleep. So do whats best for your neck and practice good body  mechanics.  Standing   To protect your neck while standing:  · Carry objects close to your body.  · Keep your ears and shoulders in a line while standing or walking.  · To lower yourself, bend at the knees with a straight back. Do this instead of looking down and reaching for objects.  · Work at eye level. Dont reach above your head or tilt your head back.  Sitting   To protect your neck while sitting:  · Set up your workstation so your monitor is at eye level. Also, use a document mcmillan when viewing papers or books.  · Keep your knees at or slightly below the level of your hips.  · Sit up straight, with feet flat on the floor. If your feet dont touch the floor, use a footrest.  · Avoid sitting or driving for long periods. Take frequent breaks.  Sleeping   To protect your neck while sleeping:  · Sleep on your back with a pillow under your knees, or on your side with a pillow between bent knees. This helps align the spine.  · Avoid using pillows that are too high or too low. Instead, use a neck roll or pillow under your neck while you sleep to keep the neck straight.  · Sleep on a mattress that supports you, with a pillow under your neck.  © 4321-3828 [employer]. [address] [place] [address], [place] [address]. All rights reserved. This information is not intended as a substitute for professional medical care. Always follow your healthcare professional's instructions.          Exercises at Your Workstation: Eyes, Neck, and Head     Tired eyes? Stiff neck? A few easy moves can help prevent these kinds of problems. Take a few minutes during your day to do these exercises--right at your desk. They'll loosen up your muscles, keep you more alert, and make a big difference in how you work and feel.    For your eyes  Eye cup  · Lean forward with your elbows on your desk.  · Cup your hands and place them lightly over your closed eyes. Hold for a minute, while breathing deeply in and out.  · Slowly uncover and  open your eyes. Repeat 2 times.  Eye roll  · Close your eyes. Slowly roll your eyeballs clockwise all the way around. Repeat 3 times.  · Now slowly roll them all the way around counterclockwise. Repeat 3 times.  Eye rest  · Every 20 minutes, look away from the computer screen. Focus on an object at least 20 feet away. Stay focused on this object for a full 20 seconds.    For your neck and head  Warm-up  · Drop your head gently to your chest. While breathing in, slowly roll your head up to your left shoulder. While breathing out, slowly roll your head back to center. Repeat to the right.  · Repeat 3 times on each side.  Head tilt  · Sit up straight. Tuck in your chin.  · Slowly tip your head to the left. Return to the center. Then, tip your head to the right.  · Repeat 3 times on each side.    Head turn  · Sit up straight.  · Slowly turn your head and look over your left shoulder. Hold for a few seconds. Go back to the center, then repeat to your right.  · Repeat 3 times on each side.  © 2037-3460 [employer], [employer]. [address] [place], [place] [address]. All rights reserved. This information is not intended as a substitute for professional medical care. Always follow your healthcare professional's instructions.          Reach and Hold Exercise    Do this exercise on your hands and knees. Keep your knees under your hips and your hands under your shoulders. Keep your spine in a neutral position (not arched or sagging). Keep your ears in line with your shoulders. Hold for a few seconds before starting the exercise:  4. Tighten your abdominal muscles and raise one arm straight in front of you, palm down. Hold for 5 seconds, then lower. Repeat 5 times.  5. Do the exercise again, this time lifting your arm to the side. Repeat 5 times.  6. Do the exercise again, this time lifting your arm backward, palm up. Repeat 5 times.  Switch sides and do each exercise with the other arm.  © 8697-4653 [employer],  [employer]. [address] [place] [address]. All rights reserved. This information is not intended as a substitute for professional medical care. Always follow your healthcare professional's instructions.        Shoulder and Upper Back Stretch  To start, stand tall with your ears, shoulders, and hips in line. Your feet should be slightly apart, positioned just under your hips. Focus your eyes directly in front of you.  this position for a few seconds before starting your exercise. This helps increase your awareness of proper posture.  Reach overhead and slightly back with both arms. Keep your shoulders and neck aligned and your elbows behind your shoulders:  · With your palms facing the ceiling, turn your fingers inward.  · Take a deep breath. Breathe out, and lower your elbows toward your buttocks. Hold for 5 seconds, then return to starting position.  · Repeat 3 times.    © 2119-7084 [employer]. [address] [place] [address]. All rights reserved. This information is not intended as a substitute for professional medical care. Always follow your healthcare professional's instructions.          Shoulder Clock Exercise  To start, stand tall with your ears, shoulders, and hips in line. Your feet should be slightly apart, positioned just under your hips. Focus your eyes directly in front of you.  this position for a few seconds before starting your exercise. This helps increase your awareness of proper posture.  · Imagine that your right shoulder is the center of a clock. With the outer point of your shoulder, roll it around to slowly trace the outer edge of the clock.  · Move clockwise first, then counterclockwise.  · Repeat 3 times. Switch shoulders.   © 4430-5242 [employer]. [address] [place] [address]. All rights reserved. This information is not intended as a substitute for professional medical care. Always follow your healthcare  professional's instructions.          Shoulder Girdle Stretch     To start, sit in a chair with your feet flat on the floor. Your weight should be slightly forward so that youre balanced evenly on your buttocks. Relax your shoulders and keep your head level. Using a chair with arms may help you keep your balance:  · Place 1 hand on the outside elbow of the other arm.  · Pull the arm across your body. Hold for 30 to 60 seconds. Repeat once.  · Switch sides.    © 1471-0415 [employer]. [address] [place] [address]. All rights reserved. This information is not intended as a substitute for professional medical care. Always follow your healthcare professional's instructions.          Shoulder Exercises      To start, sit in a chair with your feet flat on the floor. Your weight should be slightly forward so that youre balanced evenly on your buttocks. Relax your shoulders and keep your head level. Avoid arching your back or rounding your shoulders. Using a chair with arms may help you keep your balance.  · Raise your arms, elbows bent, to shoulder height.  · Slowly move your forearms together. Hold for 5 seconds.  · Return to starting position. Repeat 5 times.  © 8013-9111 [employer]. [address] [place] [address]. All rights reserved. This information is not intended as a substitute for professional medical care. Always follow your healthcare professional's instructions.        Shoulder Shrug Exercise  To start, sit in a chair with your feet flat on the floor. Shift your weight slightly forward to avoid rounding your back. Relax. Keep your ears, shoulders, and hips aligned:  · Raise both of your shoulders as high as you can, as if you were trying to touch them to your ears. Keep your head and neck still and relaxed.  · Hold for a count of 10. Release.  · Repeat 5 times.    © 8474-6535 [employer]. [address] [place] [address]. All rights  reserved. This information is not intended as a substitute for professional medical care. Always follow your healthcare professional's instructions.          Shoulder Squeeze Exercise     To start, sit in a chair with your feet flat on the floor. Shift your weight slightly forward to avoid rounding your back. Relax. Keep your ears, shoulders, and hips aligned:  · Raise your arms to shoulder height, elbows bent and palms forward.  · Move your arms back, squeezing your shoulder blades together.  · Hold for 10 seconds. Return to starting position.   · Repeat 5 times.     © 0831-8613 [employer]. [address] [place] [address]. All rights reserved. This information is not intended as a substitute for professional medical care. Always follow your healthcare professional's instructions.

## 2018-10-03 NOTE — PROGRESS NOTES
PM&R NEW PATIENT HISTORY & PHYSICAL :    Referring Physician: Weeks    Chief Complaint   Patient presents with    Muscle Pain       HPI: This is a 68 y.o.  female being seen in clinic today for evaluation of chronic tightness and spasms in her right shoulder/trapezius that began years ago and has returned more recently after cutting down a fallen branch.  With prolonged sitting or increased activity, she has spasms in her shoulder that gradually resolve.  She denies numbness, tingling, or weakness into her arms.  Rest and heat have provided some relief.     History obtained from patient    Functional History:  Walking: Not limited  Transfers: Independent  Assistive devices: No  Power mobility: No  Falls: None     Needs help with:  Nothing - all ADLS normal    Cooking   Cleaning  Bathing   Dressing   Toileting     Past family, medical, social, and surgical history reviewed in chart    Review of Systems:     General- denies lethargy, weight change, fever, chills  Head/neck- denies swallowing difficulties  ENT- + hearing changes  Cardiovascular-denies chest pain  Pulmonary- denies shortness of breath  GI- denies constipation or bowel incontinence  - denies bladder incontinence  Skin- denies wounds or rashes  Musculoskeletal- denies weakness, +pain  Neurologic- denies numbness and tingling  Psychiatric- denies depressive or psychotic features, denies anxiety  Lymphatic-denies swelling  Endocrine- denies hypoglycemic symptoms/DM history  All other pertinent systems negative     Physical Examination:  General: Well developed, well nourished female, NAD  HEENT:NCAT EOMI bilaterally   Pulmonary:Normal respirations    Spinal Examination: CERVICAL  Active ROM is within normal limits.  Inspection: No deformity of spinal alignment.  Palpation: No vertebral tenderness to percussion.  Tight and tender band along right trapezius with trigger point, ttp and tight at rhomboid and teres minor on right, mild ttp and tightness at  left trapezius   Spurling test: neg    Spinal Examination: LUMBAR or THORACIC  Active ROM is within normal limits.  Inspection: No deformity of spinal alignment.      Musculoskeletal Tests:    Elbow compression (ulnar): neg  Tinels at wrist: neg  Phalen: neg    Bilateral Upper and Lower Extremities:  Pulses are 2+ at radial,  bilaterally.  Shoulder/Elbow/Wrist/Hand ROM wnl except rotator cuff weakness on right  Hip/Knee/Ankle ROM   Bilateral Extremities show normal capillary refill.  No signs of cyanosis, rubor, edema, skin changes, or dysvascular changes of appendages.  Nails appear intact.    Neurological Exam:  Cranial Nerves:  II-XII grossly intact    Manual Muscle Testing: (Motor 5=normal)    RIGHT Upper extremity: Shoulder abduction 5/5, Biceps 5/5, Triceps 5/5, Wrist extension 5/5, Abductor pollicis brevis 5/5, Ulnar hand intrinsics 5/5,  LEFT Upper extremity: Shoulder abduction 5/5, Biceps 5/5, Triceps 5/5, Wrist extension 5/5, Abductor pollicis brevis 5/5, Ulnar hand intrinsics 5/5,      No focal atrophy is noted of either upper extremity.    Bilateral Reflexes:1+bic tri br  Arango's response is absent bilaterally.    Sensation: tested to light touch  - intact in arms  Gait: Narrow base and good arm swing.      IMPRESSION/PLAN: This is a 68 y.o.  female with myofascial pain, rotator cuff weakness on right    1. Rx for JV-Tqbcmg-Kjrbtfkekn release, stretch, ROM, cuff strengthening, posture, modalities   2. Handouts on myofascial pain, posture, stretch, exercise, etc provided  3. Ice/heat modalities prn, topical OTC agents prn  4. Fu prn    Nuria Foss M.D.  Physical Medicine and Rehab

## 2018-10-03 NOTE — LETTER
October 3, 2018      Jerome Steel,   [address] [address] [address]           [facility] - Physiatry  [address] [facility] [address]  Phone: [phone]  Fax: [phone]          Patient: Rissa Garcia   MR Number: [id number]   YOB: 1950   Date of Visit: 10/3/2018       Dear Dr. Jerome Steel:    Thank you for referring Rissa Garcia to me for evaluation. Attached you will find relevant portions of my assessment and plan of care.    If you have questions, please do not hesitate to call me. I look forward to following Rissa Garcia along with you.    Sincerely,    Barry Cabrera, MEREDITH    Enclosure  CC:  No Recipients    If you would like to receive this communication electronically, please contact [email or web address] or [phone] to request more information on [employer] Link access.    For providers and/or their staff who would like to refer a patient to [facility], please contact us through our one-stop-shop provider referral line, Benitez Mcmanus, at [phone].    If you feel you have received this communication in error or would no longer like to receive these types of communications, please e-mail [email or web address]

## 2018-10-04 ENCOUNTER — TELEPHONE (OUTPATIENT)
Dept: INTERNAL MEDICINE | Facility: CLINIC | Age: 68
End: 2018-10-04

## 2018-10-04 NOTE — TELEPHONE ENCOUNTER
----- Message from JOSE Davis,FNP-C sent at 10/3/2018  2:44 PM CDT -----  Continue fosamax and vit D. Bone density improved

## 2018-11-08 ENCOUNTER — OFFICE VISIT (OUTPATIENT)
Dept: PHYSICAL MEDICINE AND REHAB | Facility: CLINIC | Age: 68
End: 2018-11-08
Payer: MEDICARE

## 2018-11-08 VITALS
BODY MASS INDEX: 26.31 KG/M2 | DIASTOLIC BLOOD PRESSURE: 71 MMHG | WEIGHT: 134 LBS | RESPIRATION RATE: 14 BRPM | SYSTOLIC BLOOD PRESSURE: 137 MMHG | HEART RATE: 86 BPM | HEIGHT: 60 IN

## 2018-11-08 DIAGNOSIS — M79.18 MYOFASCIAL PAIN: Primary | ICD-10-CM

## 2018-11-08 DIAGNOSIS — M75.81 TENDINITIS OF RIGHT ROTATOR CUFF: ICD-10-CM

## 2018-11-08 DIAGNOSIS — M53.82 MUSCULOSKELETAL DISORDER OF THE SUBOCCIPITAL: ICD-10-CM

## 2018-11-08 PROCEDURE — 99214 OFFICE O/P EST MOD 30 MIN: CPT | Mod: 25,S$PBB,, | Performed by: PHYSICAL MEDICINE & REHABILITATION

## 2018-11-08 PROCEDURE — 20553 NJX 1/MLT TRIGGER POINTS 3/>: CPT | Mod: PBBFAC,PO | Performed by: PHYSICAL MEDICINE & REHABILITATION

## 2018-11-08 PROCEDURE — 20553 NJX 1/MLT TRIGGER POINTS 3/>: CPT | Mod: S$PBB,,, | Performed by: PHYSICAL MEDICINE & REHABILITATION

## 2018-11-08 PROCEDURE — 99999 PR PBB SHADOW E&M-EST. PATIENT-LVL III: CPT | Mod: PBBFAC,,, | Performed by: PHYSICAL MEDICINE & REHABILITATION

## 2018-11-08 PROCEDURE — 99213 OFFICE O/P EST LOW 20 MIN: CPT | Mod: PBBFAC,PO | Performed by: PHYSICAL MEDICINE & REHABILITATION

## 2018-11-08 RX ORDER — METHYLPREDNISOLONE ACETATE 40 MG/ML
40 INJECTION, SUSPENSION INTRA-ARTICULAR; INTRALESIONAL; INTRAMUSCULAR; SOFT TISSUE
Status: COMPLETED | OUTPATIENT
Start: 2018-11-08 | End: 2018-11-08

## 2018-11-08 RX ADMIN — METHYLPREDNISOLONE ACETATE 40 MG: 40 INJECTION, SUSPENSION INTRALESIONAL; INTRAMUSCULAR; INTRASYNOVIAL; SOFT TISSUE at 12:11

## 2018-11-08 NOTE — PATIENT INSTRUCTIONS
Trigger Point Injection  The cause of your muscle pain or spasms may be one or more trigger points. Your healthcare provider may decide to inject the painful spots to relax the muscle. This can help relieve your pain. Relaxing the muscle can also make movement easier. You may then be able to exercise to strengthen the muscle and help it heal.    What is a trigger point?  A trigger point is a tight, painful knot of muscle fiber. It can form where a muscle is strained or injured. The knot can sometimes be felt under the skin. A trigger point is very tender to the touch. Pain may also spread to other parts of the affected muscle. Muscles around a knee, shoulder blade, or other bones are prone to trigger points. This is because these muscles are more likely to be injured.     Injecting a trigger point can help relax the affected muscle and relieve pain.   About the injections  Any muscle in the body can have one or more trigger points. Several injections may be needed in each trigger point to best relieve pain. These injections may be given in sessions about 2 weeks apart, depending on the preference of your healthcare provider. In some cases, you may not feel much change in your symptoms until after the third injection.  Risks and possible complications  Risks and complications are very rare, but may include:  · Infection  · Bleeding  · Lung puncture (pneumothorax)  · Nerve damage   Date Last Reviewed: 9/21/2015 © 2000-2017 [employer]. [address] [place] [address], [place] [address]. All rights reserved. This information is not intended as a substitute for professional medical care. Always follow your healthcare professional's instructions.        Trigger Point Injection: Your Experience  Most trigger point injections are done in your healthcare provider's office. You may be told to stop taking certain medicines before the procedure. Bring test results with you, as instructed.    During the procedure  You  will sit in a chair or lie on an exam table so your healthcare provider can reach the affected muscle.  · Your healthcare provider will feel and stretch the muscle to find the exact spot of the trigger point. This may hurt, but will not take long.  · Once the trigger point is found, it is injected. The injection may contain medicine, such as a local anesthetic, which numbs the area. Ask your healthcare provider  what kind of medicine he or she uses.  · If you have other trigger points, the process is repeated.  After the treatment  You can go home soon after treatment. Take it easy for the rest of the day. The injection sites may be sore for a day or so. Put ice or heat on the treated sites as instructed by your healthcare provider. Ask what medicines are safe to take if you need pain relief. You can return to work the same day or the day after the treatment if your healthcare provider says its OK.  When to call your healthcare provider  Call your provider if you have increased pain, a fever over 100.4°F (38°C), chills, or drainage at the treated site.   Date Last Reviewed: 9/21/2015  © 6804-6003 [employer], [employer]. [address] [place] [address], [place] [address]. All rights reserved. This information is not intended as a substitute for professional medical care. Always follow your healthcare professional's instructions.

## 2018-11-08 NOTE — PROGRESS NOTES
PM&R CLINIC NOTE    Chief Complaint   Patient presents with    Neck Pain     on the right    Shoulder Pain     right    Numbness     right hand     HPI: This is a 68 y.o.  female being seen in clinic today for re- evaluation of chronic tightness and spasms in her right shoulder/trapezius.  She has been in PT over the past few weeks with only minimal-short term relief.  She continues to have achy pain and tightness in her shoulders/neck.    History obtained from patient    Functional History:  Walking: Not limited  Transfers: Independent  Assistive devices: No  Power mobility: No  Falls: None     Needs help with:  Nothing - all ADLS normal    Cooking   Cleaning  Bathing   Dressing   Toileting     Past family, medical, social, and surgical history reviewed in chart    Review of Systems:     General- denies lethargy, weight change, fever, chills  Head/neck- denies swallowing difficulties  ENT- + hearing changes  Cardiovascular-denies chest pain  Pulmonary- denies shortness of breath  GI- denies constipation or bowel incontinence  - denies bladder incontinence  Skin- denies wounds or rashes  Musculoskeletal- denies weakness, +pain  Neurologic- denies numbness and tingling  Psychiatric- denies depressive or psychotic features, denies anxiety  Lymphatic-denies swelling  Endocrine- denies hypoglycemic symptoms/DM history  All other pertinent systems negative     Physical Examination:  General: Well developed, well nourished female, NAD  HEENT:NCAT EOMI bilaterally   Pulmonary:Normal respirations    Spinal Examination: CERVICAL  Active ROM is within normal limits.  Inspection: No deformity of spinal alignment.  Palpation:  Tight and tender band along right trapezius with trigger point, ttp and tight at rhomboid and teres minor on right  Spurling test: neg    Spinal Examination: LUMBAR or THORACIC  Active ROM is within normal limits.  Inspection: No deformity of spinal alignment.      Musculoskeletal Tests:    Elbow  compression (ulnar): neg  Tinels at wrist: neg  Phalen: neg    Bilateral Upper and Lower Extremities:  Pulses are 2+ at radial,  bilaterally.  Shoulder/Elbow/Wrist/Hand ROM wnl improved cuff strength  Hip/Knee/Ankle ROM   Bilateral Extremities show normal capillary refill.  No signs of cyanosis, rubor, edema, skin changes, or dysvascular changes of appendages.  Nails appear intact.    Neurological Exam:  Cranial Nerves:  II-XII grossly intact    Manual Muscle Testing: (Motor 5=normal)    RIGHT Upper extremity: Shoulder abduction 5/5, Biceps 5/5, Triceps 5/5, Wrist extension 5/5, Abductor pollicis brevis 5/5, Ulnar hand intrinsics 5/5,  LEFT Upper extremity: Shoulder abduction 5/5, Biceps 5/5, Triceps 5/5, Wrist extension 5/5, Abductor pollicis brevis 5/5, Ulnar hand intrinsics 5/5,      No focal atrophy is noted of either upper extremity.    Bilateral Reflexes:1+bic tri br  Arango's response is absent bilaterally.    Sensation: tested to light touch  - intact in arms  Gait: Narrow base and good arm swing.      IMPRESSION/PLAN: This is a 68 y.o.  female with myofascial pain, rotator cuff weakness-improved    1. Cont HEP and EJ-Gmqnmo-Epwylfrghr release, stretch, ROM, cuff strengthening, posture, modalities   2. Handouts on myofascial pain, posture, stretch, exercise, etc provided  3. Ice/heat modalities prn, topical OTC agents prn  4. Trigger pt injections    Nuria Foss M.D.  Physical Medicine and Rehab    PROCEDURE NOTE    Diagnosis: Myofascial pain  Procedure: trigger point injections to right trapezius, rhomboid, teres minor    Risks and benefits of procedure explained to patient including risks of infection, bleeding, pain, or damage to surrounding tissues. All questions answered. Informed consent obtained prior to proceeding. Areas marked and prepped in sterile fashion. Using a 27g 1.25inch needle, a5 cc mixture of depo medrol 1cc (40mg) and 1% lidocaine was injected evenly into the above mentioned muscles.  None to minimal bleeding noted. ER and post injection instructions given.    Nuria Foss M.D.

## 2018-11-29 ENCOUNTER — TELEPHONE (OUTPATIENT)
Dept: PHYSICAL MEDICINE AND REHAB | Facility: CLINIC | Age: 68
End: 2018-11-29

## 2018-11-29 RX ORDER — KETOROLAC TROMETHAMINE 10 MG/1
10 TABLET, FILM COATED ORAL 2 TIMES DAILY
Qty: 8 TABLET | Refills: 0 | Status: SHIPPED | OUTPATIENT
Start: 2018-11-29 | End: 2018-11-29 | Stop reason: SDUPTHER

## 2018-11-29 RX ORDER — KETOROLAC TROMETHAMINE 10 MG/1
10 TABLET, FILM COATED ORAL 2 TIMES DAILY
Qty: 8 TABLET | Refills: 0 | Status: SHIPPED | OUTPATIENT
Start: 2018-11-29 | End: 2018-12-03

## 2018-11-29 NOTE — TELEPHONE ENCOUNTER
Contacted the pt; per  ketorolac was sent to her pharmacy, also advised ice/heat applications. Pt verbalized understanding.

## 2018-11-29 NOTE — TELEPHONE ENCOUNTER
----- Message from Britney Canchola sent at 11/29/2018  2:11 PM CST -----  Contact: pt   Call pt regarding the pain in her shoulder is worse than before. Pt want to explain to the nurse what she has done and don't want to be in pain in pain over the weekend and has done the dry needles. Pt has a physical therapy appt on Monday.   .[phone] (home)

## 2018-11-30 DIAGNOSIS — M81.0 OSTEOPOROSIS: ICD-10-CM

## 2018-11-30 RX ORDER — ALENDRONATE SODIUM 70 MG/1
TABLET ORAL
Qty: 12 TABLET | Refills: 1 | Status: SHIPPED | OUTPATIENT
Start: 2018-11-30 | End: 2019-06-10 | Stop reason: SDUPTHER

## 2018-11-30 NOTE — TELEPHONE ENCOUNTER
----- Message from Giorgi Land sent at 11/30/2018 10:09 AM CST -----  Contact: Self-[phone]  Pt would like New Rx  For Calicum Rx 70 mg sent to the mail order pharmacy.  Please call back at [phone].  x-             Pt Uses:  .  [facility] - [place], [place] - [address] [place] [address]  Phone: [phone] Fax: [phone]

## 2019-01-15 ENCOUNTER — NURSE TRIAGE (OUTPATIENT)
Dept: ADMINISTRATIVE | Facility: CLINIC | Age: 69
End: 2019-01-15

## 2019-01-16 ENCOUNTER — TELEPHONE (OUTPATIENT)
Dept: PHYSICAL MEDICINE AND REHAB | Facility: CLINIC | Age: 69
End: 2019-01-16

## 2019-01-16 ENCOUNTER — TELEPHONE (OUTPATIENT)
Dept: INTERNAL MEDICINE | Facility: CLINIC | Age: 69
End: 2019-01-16

## 2019-01-16 ENCOUNTER — OFFICE VISIT (OUTPATIENT)
Dept: INTERNAL MEDICINE | Facility: CLINIC | Age: 69
End: 2019-01-16
Payer: MEDICARE

## 2019-01-16 ENCOUNTER — HOSPITAL ENCOUNTER (EMERGENCY)
Facility: HOSPITAL | Age: 69
Discharge: HOME | End: 2019-01-16
Attending: EMERGENCY MEDICINE
Payer: MEDICARE

## 2019-01-16 VITALS
HEART RATE: 66 BPM | HEIGHT: 60 IN | DIASTOLIC BLOOD PRESSURE: 91 MMHG | TEMPERATURE: 98 F | WEIGHT: 135.5 LBS | BODY MASS INDEX: 26.6 KG/M2 | RESPIRATION RATE: 18 BRPM | SYSTOLIC BLOOD PRESSURE: 163 MMHG | OXYGEN SATURATION: 100 %

## 2019-01-16 VITALS
RESPIRATION RATE: 16 BRPM | HEART RATE: 89 BPM | WEIGHT: 136.88 LBS | OXYGEN SATURATION: 98 % | TEMPERATURE: 97 F | DIASTOLIC BLOOD PRESSURE: 76 MMHG | BODY MASS INDEX: 26.87 KG/M2 | HEIGHT: 60 IN | SYSTOLIC BLOOD PRESSURE: 130 MMHG

## 2019-01-16 DIAGNOSIS — R03.0 ELEVATED BLOOD PRESSURE READING: Primary | ICD-10-CM

## 2019-01-16 DIAGNOSIS — R20.0 ARM NUMBNESS LEFT: ICD-10-CM

## 2019-01-16 DIAGNOSIS — R20.0 FACIAL NUMBNESS: ICD-10-CM

## 2019-01-16 DIAGNOSIS — R20.2 NUMBNESS AND TINGLING: Primary | ICD-10-CM

## 2019-01-16 DIAGNOSIS — R20.0 NUMBNESS AND TINGLING: Primary | ICD-10-CM

## 2019-01-16 DIAGNOSIS — R20.0 NUMBNESS: ICD-10-CM

## 2019-01-16 LAB
ALBUMIN SERPL BCP-MCNC: 3.9 G/DL
ALP SERPL-CCNC: 77 U/L
ALT SERPL W/O P-5'-P-CCNC: 21 U/L
ANION GAP SERPL CALC-SCNC: 12 MMOL/L
APTT BLDCRRT: 46.7 SEC
AST SERPL-CCNC: 19 U/L
BASOPHILS # BLD AUTO: 0.03 K/UL
BASOPHILS NFR BLD: 0.6 %
BILIRUB SERPL-MCNC: 0.5 MG/DL
BUN SERPL-MCNC: 12 MG/DL
CALCIUM SERPL-MCNC: 9.4 MG/DL
CHLORIDE SERPL-SCNC: 106 MMOL/L
CO2 SERPL-SCNC: 21 MMOL/L
CREAT SERPL-MCNC: 1 MG/DL
DIFFERENTIAL METHOD: NORMAL
EOSINOPHIL # BLD AUTO: 0 K/UL
EOSINOPHIL NFR BLD: 0.2 %
ERYTHROCYTE [DISTWIDTH] IN BLOOD BY AUTOMATED COUNT: 14.1 %
EST. GFR  (AFRICAN AMERICAN): >60 ML/MIN/1.73 M^2
EST. GFR  (NON AFRICAN AMERICAN): 57.6 ML/MIN/1.73 M^2
GLUCOSE SERPL-MCNC: 106 MG/DL
HCT VFR BLD AUTO: 40.6 %
HGB BLD-MCNC: 13.6 G/DL
INR PPP: 1.1
LYMPHOCYTES # BLD AUTO: 1.7 K/UL
LYMPHOCYTES NFR BLD: 34.4 %
MCH RBC QN AUTO: 30 PG
MCHC RBC AUTO-ENTMCNC: 33.5 G/DL
MCV RBC AUTO: 89 FL
MONOCYTES # BLD AUTO: 0.5 K/UL
MONOCYTES NFR BLD: 9.1 %
NEUTROPHILS # BLD AUTO: 2.8 K/UL
NEUTROPHILS NFR BLD: 55.5 %
PLATELET # BLD AUTO: 246 K/UL
PMV BLD AUTO: 10.2 FL
POTASSIUM SERPL-SCNC: 4 MMOL/L
PROT SERPL-MCNC: 7.2 G/DL
PROTHROMBIN TIME: 11.4 SEC
RBC # BLD AUTO: 4.54 M/UL
SODIUM SERPL-SCNC: 139 MMOL/L
TROPONIN I SERPL DL<=0.01 NG/ML-MCNC: <0.006 NG/ML
WBC # BLD AUTO: 5.06 K/UL

## 2019-01-16 PROCEDURE — 85610 PROTHROMBIN TIME: CPT | Mod: ER

## 2019-01-16 PROCEDURE — 85730 THROMBOPLASTIN TIME PARTIAL: CPT | Mod: ER

## 2019-01-16 PROCEDURE — 85025 COMPLETE CBC W/AUTO DIFF WBC: CPT | Mod: ER

## 2019-01-16 PROCEDURE — 99214 PR OFFICE/OUTPT VISIT, EST, LEVL IV, 30-39 MIN: ICD-10-PCS | Mod: S$PBB,,, | Performed by: FAMILY MEDICINE

## 2019-01-16 PROCEDURE — 25000003 PHARM REV CODE 250: Mod: ER | Performed by: EMERGENCY MEDICINE

## 2019-01-16 PROCEDURE — 80053 COMPREHEN METABOLIC PANEL: CPT | Mod: ER

## 2019-01-16 PROCEDURE — 99999 PR PBB SHADOW E&M-EST. PATIENT-LVL III: CPT | Mod: PBBFAC,,, | Performed by: FAMILY MEDICINE

## 2019-01-16 PROCEDURE — 99285 EMERGENCY DEPT VISIT HI MDM: CPT | Mod: ER,27,25

## 2019-01-16 PROCEDURE — 93010 ELECTROCARDIOGRAM REPORT: CPT | Mod: ,,, | Performed by: INTERNAL MEDICINE

## 2019-01-16 PROCEDURE — 93005 ELECTROCARDIOGRAM TRACING: CPT | Mod: ER

## 2019-01-16 PROCEDURE — 99214 OFFICE O/P EST MOD 30 MIN: CPT | Mod: S$PBB,,, | Performed by: FAMILY MEDICINE

## 2019-01-16 PROCEDURE — 93010 EKG 12-LEAD: ICD-10-PCS | Mod: ,,, | Performed by: INTERNAL MEDICINE

## 2019-01-16 PROCEDURE — 99213 OFFICE O/P EST LOW 20 MIN: CPT | Mod: PBBFAC,25,PO | Performed by: FAMILY MEDICINE

## 2019-01-16 PROCEDURE — 84484 ASSAY OF TROPONIN QUANT: CPT | Mod: ER

## 2019-01-16 PROCEDURE — 99900035 HC TECH TIME PER 15 MIN (STAT): Mod: ER

## 2019-01-16 PROCEDURE — 99999 PR PBB SHADOW E&M-EST. PATIENT-LVL III: ICD-10-PCS | Mod: PBBFAC,,, | Performed by: FAMILY MEDICINE

## 2019-01-16 RX ORDER — AMLODIPINE BESYLATE 5 MG/1
5 TABLET ORAL DAILY
Qty: 30 TABLET | Refills: 0 | Status: SHIPPED | OUTPATIENT
Start: 2019-01-16 | End: 2020-08-25

## 2019-01-16 RX ORDER — NAPROXEN SODIUM 220 MG/1
324 TABLET, FILM COATED ORAL
Status: COMPLETED | OUTPATIENT
Start: 2019-01-16 | End: 2019-01-16

## 2019-01-16 RX ORDER — ATORVASTATIN CALCIUM 20 MG/1
20 TABLET, FILM COATED ORAL DAILY
Qty: 30 TABLET | Refills: 0 | Status: SHIPPED | OUTPATIENT
Start: 2019-01-16 | End: 2020-08-25

## 2019-01-16 RX ORDER — NAPROXEN SODIUM 220 MG/1
81 TABLET, FILM COATED ORAL DAILY
Refills: 0 | COMMUNITY
Start: 2019-01-16 | End: 2020-08-25

## 2019-01-16 RX ORDER — OMEPRAZOLE 20 MG/1
20 CAPSULE, DELAYED RELEASE ORAL DAILY
Qty: 90 CAPSULE | Refills: 1 | Status: SHIPPED | OUTPATIENT
Start: 2019-01-16 | End: 2020-08-25 | Stop reason: ALTCHOICE

## 2019-01-16 RX ADMIN — ASPIRIN 81 MG 324 MG: 81 TABLET ORAL at 11:01

## 2019-01-16 NOTE — TELEPHONE ENCOUNTER
----- Message from Mariel Mills sent at 1/16/2019 12:50 PM CST -----  needs callback, will elaborate..[phone] (home)

## 2019-01-16 NOTE — TELEPHONE ENCOUNTER
----- Message from Mariel Mills sent at 1/16/2019 12:50 PM CST -----  returned alia's call rg hosp visit..[phone] (home)

## 2019-01-16 NOTE — TELEPHONE ENCOUNTER
Contacted the pt; she was in ER for HBP, and arm numbness/tingling. Pt will f/u with her PCP. 's notified.

## 2019-01-16 NOTE — TELEPHONE ENCOUNTER
Called patient whos stated she did go to ER. Patient states test was done to rule out stroke, but she left feeling the same. Patient states she was told to follow up with her PCP. Patient has an appointment with Dr. Rasheed on Friday at 11:40. Patient express she would like to see Dr. Steel. Patient was informed that Dr. Steel did not have anything open,but if someone cancel I could call her. Patient agreed

## 2019-01-16 NOTE — TELEPHONE ENCOUNTER
Call and spoke with patient after receiving message that patient call online nurse with complaints of dizzy and tingling around moth. Patient was advise by nurse to call 911. Patient refused. Patient was call to follow up on symptoms this morning. Patient states she feel about the same. Patient states she was coming in today to see doctor, inform patient we did not have anything open today. Patient was encouraged to go to ED. Patient states she do not want to go to ED, she did not have a good experience at ER . Patient was encouraged to go to Ed it may be a stroke strongly express going to ER. Patient states she was going to go to [facility] urgent care in [place] after having her coffee. Patient states blood pressure had can down. Is was repeat several time to go to ED. Patient ask if her insurance will cover it. Patient was advise not to worry about her insurance, I am unable to tell if it will be covered. Patient was encourage to go to ED. Patient refuse again saying she was not going to ED

## 2019-01-16 NOTE — TELEPHONE ENCOUNTER
Reason for Disposition   [1] Numbness (i.e., loss of sensation) of the face, arm / hand, or leg / foot on one side of the body AND [2] sudden onset AND [3] present now    Protocols used: ST NEUROLOGIC DEFICIT-A-AH    Pt reports that she has had dizziness on and off all day. Starting about 3 pm she has had tingling around her mouth and down her left arm as well. Tinging is still present around her mouth. Per protocol advised to hang up and call 911. Pt refused- states that she has a blind dog at home and cannot go to hospital. Contact patient to further advise

## 2019-01-16 NOTE — ASSESSMENT & PLAN NOTE
Differential at this point continues to be broad however have effectively ruled out ischemic or hemorrhagic event with CT and MRI earlier today.  All lab studies so far have been normal.  Need to think more in terms of an inflammatory process or immunological issue.  Will follow up with her tomorrow to see how she is doing.  If need be will refer to Neurology prefers will get some follow-up testing with a and a CRP and ESR.

## 2019-01-16 NOTE — PROGRESS NOTES
Subjective:       Patient ID: Rissa Garcia is a 68 y.o. female.    Chief Complaint: Follow-up (ED)    HPI  Here today to follow-up from recent emergency room visit.  She was seen this morning for new onset of symptoms that began yesterday with numbness and tingling on the left side of her face and tongue.  It progressed to left arm numbness and today she started having left leg numbness which caused her to seek medical advice.  She did not have any weakness or facial drooping.  No trouble closing or opening her eyes.  No speech deficit.  No problem swallowing.  She had CT scan and MRI of the brain today in the emergency room along with lab work and everything was normal.  Also had normal chest x-ray.  Reports that she feels that she had similar symptoms in regards to the face before whenever she had Bell's palsy.  She reports that she has had 2 different bouts of Bell's palsy.  Also has been having physical therapy for her neck and is wondering if any of this could be from a pinched nerve.  She has not started any new medications.  Has not had any other upper respiratory symptoms or fever.  No other changes.    Family History   Problem Relation Age of Onset    Diabetes Mother     Osteoporosis Mother     Heart disease Father     Diabetes Brother     Sleep apnea Brother     Cataracts Maternal Aunt        Current Outpatient Medications:     alendronate (FOSAMAX) 70 MG tablet, TAKE 1 TABLET ONCE WEEKLY IN THE MORNING WITH A FULL GLASS OF WATER ON AN EMPTY STOMACH. DO NOT LIE DOWN FOR AT LEAST 30 MINUTES AFTERWARDS., Disp: 12 tablet, Rfl: 1    amLODIPine (NORVASC) 5 MG tablet, Take 1 tablet (5 mg total) by mouth once daily., Disp: 30 tablet, Rfl: 0    aspirin 81 MG Chew, Take 1 tablet (81 mg total) by mouth once daily., Disp: , Rfl: 0    atorvastatin (LIPITOR) 20 MG tablet, Take 1 tablet (20 mg total) by mouth once daily., Disp: 30 tablet, Rfl: 0    cholecalciferol, vitamin D3, (VITAMIN D3) 1,000 unit Chew,  Take 2 tabs daily, Disp: , Rfl:     fluocinonide 0.05% (LIDEX) 0.05 % cream, Apply topically 2 (two) times daily., Disp: 120 g, Rfl: 1    fluocinonide-emollient (FLUOCINONIDE-E) 0.05 % Crea, Apply topically., Disp: , Rfl:     omeprazole (PRILOSEC) 20 MG capsule, Take 1 capsule (20 mg total) by mouth once daily., Disp: 90 capsule, Rfl: 1    triamcinolone acetonide 0.1% (KENALOG) 0.1 % cream, Apply topically 2 (two) times daily., Disp: , Rfl:   No current facility-administered medications for this visit.     Review of Systems   Constitutional: Negative for activity change, chills, fatigue and fever.   Respiratory: Negative for cough and shortness of breath.    Cardiovascular: Negative for chest pain.   Gastrointestinal: Negative for abdominal pain.   Skin: Negative for rash.   Neurological: Positive for light-headedness and numbness. Negative for dizziness, tremors, seizures, syncope, facial asymmetry, speech difficulty, weakness and headaches.       Objective:   /76 (BP Location: Right arm, Patient Position: Sitting, BP Method: Medium (Manual))   Pulse 89   Temp 96.7 °F (35.9 °C) (Tympanic)   Resp 16   Ht 5' (1.524 m)   Wt 62.1 kg (136 lb 14.5 oz)   SpO2 98%   BMI 26.74 kg/m²      Physical Exam   Constitutional: She is oriented to person, place, and time. She appears well-developed and well-nourished. No distress.   HENT:   Head: Normocephalic and atraumatic.   Nose: Nose normal.   Cannot completely visualize the left tympanic membrane secondary to wax   Eyes: Conjunctivae and EOM are normal. Pupils are equal, round, and reactive to light. Right eye exhibits no discharge. Left eye exhibits no discharge.   Neck: No thyromegaly present.   Cardiovascular: Normal rate and regular rhythm.   No murmur heard.  Pulmonary/Chest: Effort normal and breath sounds normal. No respiratory distress.   Abdominal: Soft. She exhibits no distension.   Musculoskeletal: She exhibits no edema.   Neurological: She is alert  and oriented to person, place, and time. No cranial nerve deficit ( all cranial nerves normal). She exhibits normal muscle tone (Equal muscle strength of lower extremities and upper extremities.  5/5  strength on both sides.). Coordination normal.   Skin: No rash noted. She is not diaphoretic.   Psychiatric: She has a normal mood and affect. Her behavior is normal.       Assessment & Plan     Problem List Items Addressed This Visit        Other    Numbness and tingling - Primary    Current Assessment & Plan     Differential at this point continues to be broad however have effectively ruled out ischemic or hemorrhagic event with CT and MRI earlier today.  All lab studies so far have been normal.  Need to think more in terms of an inflammatory process or immunological issue.  Will follow up with her tomorrow to see how she is doing.  If need be will refer to Neurology prefers will get some follow-up testing with a and a CRP and ESR.         Relevant Orders    LILIANE Screen w/Reflex    Sedimentation rate    C-reactive protein        Told her that she could hold off on the amlodipine aspirin and atorvastatin for now.  Since blood pressure has normalized    No Follow-up on file.

## 2019-01-17 ENCOUNTER — PATIENT MESSAGE (OUTPATIENT)
Dept: INTERNAL MEDICINE | Facility: CLINIC | Age: 69
End: 2019-01-17

## 2019-01-17 ENCOUNTER — TELEPHONE (OUTPATIENT)
Dept: INTERNAL MEDICINE | Facility: CLINIC | Age: 69
End: 2019-01-17

## 2019-01-17 ENCOUNTER — TELEPHONE (OUTPATIENT)
Dept: PHYSICAL MEDICINE AND REHAB | Facility: CLINIC | Age: 69
End: 2019-01-17

## 2019-01-17 NOTE — TELEPHONE ENCOUNTER
Pt called returned, pt request a call from doctor to discuss labs concerns which was informed normal per . Nurse did explain to pt APTT test is a test that characterizes coagulation (clotting) of your blood. Pt states test shows high and wants to know what needs to be done?

## 2019-01-17 NOTE — TELEPHONE ENCOUNTER
Still awaiting one test result to come back. I suggest that we wait for that. However, if she experiences a change or worsening of symptoms, please let me know. Additionally, if she would like a neurology referral we can arrange that.

## 2019-01-17 NOTE — TELEPHONE ENCOUNTER
----- Message from Mehran Ocampo sent at 1/17/2019 10:57 AM CST -----  Contact: self [phone]  Would like to return call from nurse; pt did not provide reason for call.  Please call back at [phone]. Md Nora

## 2019-01-17 NOTE — TELEPHONE ENCOUNTER
That one was done in ED yesterday. It is out of range but not severely. We can repeat next week to verify that it has normalized. How is she feeling today?

## 2019-01-17 NOTE — TELEPHONE ENCOUNTER
----- Message from Kelsi Duboseite sent at 1/17/2019 11:02 AM CST -----  Contact: Pt  Pt called and stated she was returning a call. She can be reached at [phone].    Thanks,  TF

## 2019-01-17 NOTE — TELEPHONE ENCOUNTER
----- Message from Jerome Steel, DO sent at 1/17/2019 10:20 AM CST -----  Please inform patient that all of the labs are normal if he/she does not have [facility] activated. If there are any questions please let me know.

## 2019-01-17 NOTE — TELEPHONE ENCOUNTER
Contacted the pt; she stated that she saw her PCP, she wants to wait to resume PT until numbness in her arm is gone.  's notified.

## 2019-01-17 NOTE — TELEPHONE ENCOUNTER
Pt states she continues to have numbness with warm sensation on side of numbness on yesterday bu today have not experienced any warm sensation but numbness. Pt would like to know if there are  any further test you can order to  find out what's going on that's causing the her to have this numbness. Please review

## 2019-01-18 ENCOUNTER — TELEPHONE (OUTPATIENT)
Dept: RHEUMATOLOGY | Facility: CLINIC | Age: 69
End: 2019-01-18

## 2019-01-18 ENCOUNTER — TELEPHONE (OUTPATIENT)
Dept: INTERNAL MEDICINE | Facility: CLINIC | Age: 69
End: 2019-01-18

## 2019-01-18 DIAGNOSIS — R20.0 NUMBNESS AND TINGLING: Primary | ICD-10-CM

## 2019-01-18 DIAGNOSIS — R20.2 NUMBNESS AND TINGLING: Primary | ICD-10-CM

## 2019-01-18 DIAGNOSIS — R76.8 CENTROMERE ANTIBODY POSITIVE: Primary | ICD-10-CM

## 2019-01-18 NOTE — TELEPHONE ENCOUNTER
LILIANE returned positive. I cannot convey exactly what it means and if it is exactly correlated with current symptoms. However, I recommend that we consult with rheum.

## 2019-01-18 NOTE — TELEPHONE ENCOUNTER
----- Message from Vikki Dawson sent at 1/18/2019 10:49 AM CST -----  Contact: pt  Pt stated she will like a call back, she can be reached at [phone] Thanks

## 2019-01-18 NOTE — TELEPHONE ENCOUNTER
Place referral for neuromedical center at pt request to see Dr. Carri Michel or Zulma Garcia for evaluation.

## 2019-01-18 NOTE — TELEPHONE ENCOUNTER
Called pt back as req in prev msg, states would like Dr. OSORIO to advise her if she should see a neurologist first based on current symptoms of numbness in tongue, face and left arm. States ER has ruled out stroke and PCP referred her to Rheum. Would like you to advise if this is more of a neurologic issue

## 2019-01-21 ENCOUNTER — TELEPHONE (OUTPATIENT)
Dept: INTERNAL MEDICINE | Facility: CLINIC | Age: 69
End: 2019-01-21

## 2019-01-21 DIAGNOSIS — R20.0 NUMBNESS AND TINGLING: Primary | ICD-10-CM

## 2019-01-21 DIAGNOSIS — R20.2 NUMBNESS AND TINGLING: Primary | ICD-10-CM

## 2019-01-21 NOTE — TELEPHONE ENCOUNTER
----- Message from Kerri Jackson sent at 1/21/2019 10:19 AM CST -----  Contact: pt  Calling regarding referral to neurologist.

## 2019-01-21 NOTE — ED PROVIDER NOTES
"   History     Chief Complaint   Patient presents with    Numbness     left facial/tongue, left arm numbness, onset 3:30pm yesterday, reports "worse when I'm standing"       Review of patient's allergies indicates:   Allergen Reactions    Penicillins        History of Present Illness   HPI    1/16/2019, 10:07 AM  The history is provided by the patient    Rissa Garcia is a 68 y.o. female presenting to the ED for left arm numbness, left facial numbness.  Patient reports that approximately 15:30 yesterday, patient reports that the tip of her tongue was numb as well as both her lips on the left side of her face.  Symptoms have been persistent since yesterday.  Nothing makes it better, nothing makes it worse.  Patient denies any history of hypertension, diabetes, hypercholesterolemia, prior CVA, or heart arrhythmia.  Patient denies any vision loss, difficulty speaking, facial expression changes, balance issues, weakness of the arm.  Patient denies any chest pain, chest pressure, shortness of breath, melena,hematochezia, dysuria urgency or frequency      Arrival mode:  Personal Vehicle    PCP: Jerome Steel DO     Allergies:  Review of patient's allergies indicates:   Allergen Reactions    Penicillins        Past Medical History:  Past Medical History:   Diagnosis Date    Osteoporosis     via [facility] 9/15       Past Surgical History:  Past Surgical History:   Procedure Laterality Date    BREAST BIOPSY           Family History:  Family History   Problem Relation Age of Onset    Diabetes Mother     Osteoporosis Mother     Heart disease Father     Diabetes Brother     Sleep apnea Brother     Cataracts Maternal Aunt        Social History:  Social History     Tobacco Use    Smoking status: Never Smoker    Smokeless tobacco: Never Used   Substance and Sexual Activity    Alcohol use: No    Drug use: No    Sexual activity: No        Review of Systems   Review of Systems   Constitutional: Negative for fever. " [facility] Preoperative Instructions Surgery Date 01/21/2019          Time of Arrival 1030 am Contact Wiley Dandy Daughter in law # [phone] 1. On the day of your surgery, please report to the Surgical Services Registration Desk and sign in at your designated time. The Surgery Center is located to the right of the Emergency Room. 2. You must have someone with you to drive you home. You should not drive a car for 24 hours following surgery. Please make arrangements for a friend or family member to stay with you for the first 24 hours after your surgery. 3. Do not have anything to eat or drink (including water, gum, mints, coffee, juice) after midnight ?? .? This may not apply to medications prescribed by your physician. ?(Please note below the special instructions with medications to take the morning of your procedure.) 4. We recommend you do not drink any alcoholic beverages for 24 hours before and after your surgery. 5. Contact your surgeons office for instructions on the following medications: non-steroidal anti-inflammatory drugs (i.e. Advil, Aleve), vitamins, and supplements. (Some surgeons will want you to stop these medications prior to surgery and others may allow you to take them) **If you are currently taking Plavix, Coumadin, Aspirin and/or other blood-thinning agents, contact your surgeon for instructions. ** Your surgeon will partner with the physician prescribing these medications to determine if it is safe to stop or if you need to continue taking. Please do not stop taking these medications without instructions from your surgeon 6. Wear comfortable clothes. Wear glasses instead of contacts. Do not bring any money or jewelry. Please bring picture ID, insurance card, and any prearranged co-payment or hospital payment. Do not wear make-up, particularly mascara the morning of your surgery.   Do not wear nail   HENT: Negative for facial swelling, rhinorrhea and sore throat.    Eyes: Negative for visual disturbance.   Respiratory: Negative for shortness of breath.    Cardiovascular: Negative for chest pain.   Gastrointestinal: Negative for nausea.   Genitourinary: Negative for dysuria and urgency.   Musculoskeletal: Negative for back pain.   Skin: Negative for rash.   Neurological: Positive for numbness (Left arm numbess, Left Facial Numbness). Negative for dizziness, syncope, facial asymmetry, speech difficulty, weakness, light-headedness and headaches.   Hematological: Does not bruise/bleed easily.      Physical Exam     Initial Vitals [01/16/19 0917]   BP Pulse Resp Temp SpO2   (!) 195/85 75 18 98.1 °F (36.7 °C) 98 %      MAP       --          Physical Exam    Nursing Notes and Vital Signs Reviewed.  Constitutional: Patient is in no apparent distress. Well-developed and well-nourished.  Head: Atraumatic. Normocephalic.  Eyes: PERRL. EOM intact. Conjunctivae are not pale. No scleral icterus.  ENT: Mucous membranes are moist. Oropharynx is clear and symmetric.    Neck: Supple. Full ROM. No lymphadenopathy.  No midline neck tenderness noted.  Negative bruits.  Cardiovascular: Regular rate. Regular rhythm. No murmurs, rubs, or gallops. Distal pulses are 2+ and symmetri.  Pulmonary/Chest: No respiratory distress. Clear to auscultation bilaterally. No wheezing or rales.  Abdominal: Soft and non-distended.  There is no tenderness.  No rebound, guarding, or rigidity. Good bowel sounds.  Genitourinary: No CVA tenderness  Musculoskeletal: Moves all extremities. No obvious deformities. No edema. No calf tenderness.  Skin: Warm and dry.  Neurological:  Alert, awake, and appropriate.  Normal speech.  No acute focal neurological deficits are appreciated. Cranial nerves 2-12 intact. Finger-to-nose intact.  Negative pronator drift.  Ambulates without difficulty.  Psychiatric: Normal affect. Good eye contact. Appropriate in  polish, particularly if you are having foot /hand surgery. Wear your hair loose or down, no ponytails, buns, germán pins or clips. All body piercings must be removed. Please shower with antibacterial soap for three consecutive days before and on the morning of surgery, but do not apply any lotions, powders or deodorants after the shower on the day of surgery. Please use a fresh towels after each shower. Please sleep in clean clothes and change bed linens the night before surgery. Please do not shave for 48 hours prior to surgery. Shaving of the face is acceptable. 7. You should understand that if you do not follow these instructions your surgery may be cancelled. If your physical condition changes (I.e. fever, cold or flu) please contact your surgeon as soon as possible. 8. It is important that you be on time. If a situation occurs where you may be late, please call [phone] (OR Holding Area). 9. If you have any questions and or problems, please call [phone] (Pre-admission Testing). 10. Your surgery time may be subject to change. You will receive a phone call the evening prior if your time changes. 11.  If having outpatient surgery, you must have someone to drive you here, stay with you during the duration of your stay, and to drive you home at time of discharge. 12.   In an effort to improve the efficiency, privacy, and safety for all of our Pre-op patients visitors are not allowed in the Holding area. Once you arrive and are registered your family/visitors will be asked to remain in the waiting room. The Pre-op staff will get you from the Surgical Waiting Area and will explain to you and your family/visitors that the Pre-op phase is beginning. The staff will answer any questions and provide instructions for tracking of the patient, by use of the existing tracking number and color-coded status board in the waiting room.   At this content.     ED Course   Procedures  ED Vital Signs:  Vitals:    01/16/19 0917 01/16/19 0942 01/16/19 0951 01/16/19 1041   BP: (!) 195/85 (!) 209/100  (!) 189/93   Pulse: 75 79 74 68   Resp: 18 18  15   Temp: 98.1 °F (36.7 °C)      TempSrc: Oral      SpO2: 98% 100%     Weight: 61.4 kg (135 lb 7.6 oz)      Height: 5' (1.524 m)       01/16/19 1043   BP:    Pulse:    Resp:    Temp:    TempSrc:    SpO2: 100%   Weight:    Height:        Abnormal Lab Results:  Labs Reviewed   APTT - Abnormal; Notable for the following components:       Result Value    aPTT 46.7 (*)     All other components within normal limits   COMPREHENSIVE METABOLIC PANEL - Abnormal; Notable for the following components:    CO2 21 (*)     eGFR if non  57.6 (*)     All other components within normal limits   PROTIME-INR   TROPONIN I   CBC W/ AUTO DIFFERENTIAL        All Lab Results:  Results for orders placed or performed during the hospital encounter of 01/16/19   APTT   Result Value Ref Range    aPTT 46.7 (H) 21.0 - 32.0 sec   Protime-INR   Result Value Ref Range    Prothrombin Time 11.4 9.0 - 12.5 sec    INR 1.1 0.8 - 1.2   Troponin I   Result Value Ref Range    Troponin I <0.006 0.000 - 0.026 ng/mL   Comprehensive metabolic panel   Result Value Ref Range    Sodium 139 136 - 145 mmol/L    Potassium 4.0 3.5 - 5.1 mmol/L    Chloride 106 95 - 110 mmol/L    CO2 21 (L) 23 - 29 mmol/L    Glucose 106 70 - 110 mg/dL    BUN, Bld 12 8 - 23 mg/dL    Creatinine 1.0 0.5 - 1.4 mg/dL    Calcium 9.4 8.7 - 10.5 mg/dL    Total Protein 7.2 6.0 - 8.4 g/dL    Albumin 3.9 3.5 - 5.2 g/dL    Total Bilirubin 0.5 0.1 - 1.0 mg/dL    Alkaline Phosphatase 77 55 - 135 U/L    AST 19 10 - 40 U/L    ALT 21 10 - 44 U/L    Anion Gap 12 8 - 16 mmol/L    eGFR if African American >60.0 >60 mL/min/1.73 m^2    eGFR if non  57.6 (A) >60 mL/min/1.73 m^2   CBC auto differential   Result Value Ref Range    WBC 5.06 3.90 - 12.70 K/uL    RBC 4.54 4.00 - 5.40 M/uL  time the staff will also ask for your designated spokesperson information in the event that the physician or staff need to provide an update or obtain any pertinent information. The designated spokesperson will be notified if the physician needs to speak to family during the pre-operative phase. If at any time your family/visitors has questions or concerns they may approach the volunteer desk in the waiting area for assistance. Special Instructions: MEDICATIONS TO TAKE THE MORNING OF SURGERY WITH A SIP OF WATER:bumex, coreg, flexeril if needed, proscar, prozac, isosorbide, zaroxlyn, prilosec, lyrica I understand a pre-operative phone call will be made to verify my surgery time. In the event that I am not available, I give permission for a message to be left on my answering service and/or with another person? yes 
 
 
 
 ___________________      __________   _________ 
  (Signature of Patient)             (Witness)                (Date and Time)    Hemoglobin 13.6 12.0 - 16.0 g/dL    Hematocrit 40.6 37.0 - 48.5 %    MCV 89 82 - 98 fL    MCH 30.0 27.0 - 31.0 pg    MCHC 33.5 32.0 - 36.0 g/dL    RDW 14.1 11.5 - 14.5 %    Platelets 246 150 - 350 K/uL    MPV 10.2 9.2 - 12.9 fL    Gran # (ANC) 2.8 1.8 - 7.7 K/uL    Lymph # 1.7 1.0 - 4.8 K/uL    Mono # 0.5 0.3 - 1.0 K/uL    Eos # 0.0 0.0 - 0.5 K/uL    Baso # 0.03 0.00 - 0.20 K/uL    Gran% 55.5 38.0 - 73.0 %    Lymph% 34.4 18.0 - 48.0 %    Mono% 9.1 4.0 - 15.0 %    Eosinophil% 0.2 0.0 - 8.0 %    Basophil% 0.6 0.0 - 1.9 %    Differential Method Automated          The EKG was ordered, reviewed, and independently interpreted by the ED provider.  EKG:  Rate is 74 beats per minute.  Left axis deviation.  No acute ST or T-wave changes noted           Imaging Results:  Imaging Results          X-Ray Chest AP Portable (Final result)  Result time 01/16/19 10:50:57    Final result by LOPEZ Wharton Sr., MD (01/16/19 10:50:57)                 Impression:      Normal study.      Electronically signed by: Demetrio Wharton MD  Date:    01/16/2019  Time:    10:50             Narrative:    EXAMINATION:  XR CHEST AP PORTABLE    CLINICAL HISTORY:  Anesthesia of skin    COMPARISON:  None    FINDINGS:  The size of the heart is normal. The lungs are clear. There is no pneumothorax.  The costophrenic angles are sharp.                               MRI Brain Without Contrast (Final result)  Result time 01/16/19 11:05:53    Final result by Benja Haines MD (01/16/19 11:05:53)                 Impression:      No acute findings.  Mild cerebral and cerebellar volume loss with associated white matter degeneration.  Otherwise negative.      Electronically signed by: Benja Haines MD  Date:    01/16/2019  Time:    11:05             Narrative:    EXAMINATION:  MRI BRAIN WITHOUT CONTRAST    CLINICAL HISTORY:  Anesthesia of skinNeuro deficit(s), subacute;left facial numbness, left arm numbness; TIA.    TECHNIQUE:  Standard multiplanar  noncontrast sequences of the brain.    COMPARISON:  CT brain 01/16/2019.    FINDINGS:  The ventricles are mildly enlarged as are the extra-axial CSF spaces.    Several small scattered white matter hyperintense T2 signal foci are present consistent with age-related small vessel ischemic degeneration.    The diffusion-weighted sequence is negative.  No evidence of acute infarction.    No acute hemorrhage or extra-axial fluid collection.    Pituitary gland region appears normal.    The diffusion sequence is negative.                               CT Head Without Contrast (Final result)  Result time 01/16/19 10:59:25    Final result by Maikol Rodríguez MD (01/16/19 10:59:25)                 Impression:      Chronic microvascular ischemic changes. If there is additional clinical concern for acute infarct, MRI with diffusion weighted imaging recommended.      Electronically signed by: Maikol Rodríguez MD  Date:    01/16/2019  Time:    10:59             Narrative:    EXAMINATION:  CT HEAD WITHOUT CONTRAST    CLINICAL HISTORY:  left arm numbness, left jaw numbness, left tongue; Anesthesia of skin    TECHNIQUE:  Low dose axial CT images obtained throughout the head without intravenous contrast. Sagittal and coronal reconstructions were performed.  All CT scans at this facility use dose modulation, iterative reconstruction and/or weight based dosing when appropriate to reduce radiation dose to as low as reasonably achievable.    COMPARISON:  None.    FINDINGS:  Intracranial compartment:    The brain parenchyma demonstrates areas of decreased attenuation with mild to moderate periventricular white matter consistent with chronic microvascular ischemic changes..  No parenchymal mass, hemorrhage, edema or major vascular distribution infarct.  Vascular calcifications are noted.    Mild prominence of the sulci and ventricles are consistent with age-related involutional changes.    No extra-axial blood or fluid  collections.    Skull/extracranial contents (limited evaluation): No fracture. Mastoid air cells and paranasal sinuses are essentially clear.                                   The Emergency Provider reviewed the vital signs and test results, which are outlined above.     ED Discussion     10:07 AM Patient in MRI:  Patient reports that she is claustrophobic.  She is requesting something to help her during the exam.  Patient seeing if someone could come pick her if given sedation.    10:37 AM patient was able to tolerate MRI.  On monitor.  Blood pressure is noted is elevated-not on any medication    12:01 PM spoke with Dr. pierce.  He recommended atorvastatin 20 mg daily, Norvasc 5 mg, aspirin daily as well as follow up with him on Friday.    12:07 PM  Reassessment: Dr. Saldana reassessed the pt.  The pt is resting comfortably and is NAD.  Pt states their sx have improved. Discussed test results, shared treatment plan, specific conditions for return, and the need for f/u.  Answered their questions at this time.  Pt understands and agrees to the plan.  The pt has remained hemodynamically stable through ED course and is stable for discharge.      I discussed with patient and/or family/caretaker that evaluation in the ED does not suggest any emergent or life threatening medical conditions requiring immediate intervention beyond what was provided in the ED, and I believe patient is safe for discharge.  Regardless, an unremarkable evaluation in the ED does not preclude the development or presence of a serious of life threatening condition. As such, patient was instructed to return immediately for any worsening or change in current symptoms.      ED Medication(s):  Medications   aspirin chewable tablet 324 mg (324 mg Oral Given 1/16/19 1118)          Medication List      START taking these medications    amLODIPine 5 MG tablet  Commonly known as:  NORVASC  Take 1 tablet (5 mg total) by mouth once daily.     aspirin 81 MG  Chew  Take 1 tablet (81 mg total) by mouth once daily.     atorvastatin 20 MG tablet  Commonly known as:  LIPITOR  Take 1 tablet (20 mg total) by mouth once daily.        ASK your doctor about these medications    alendronate 70 MG tablet  Commonly known as:  FOSAMAX  TAKE 1 TABLET ONCE WEEKLY IN THE MORNING WITH A FULL GLASS OF WATER ON AN EMPTY STOMACH. DO NOT LIE DOWN FOR AT LEAST 30 MINUTES AFTERWARDS.     fluocinonide 0.05% 0.05 % cream  Commonly known as:  LIDEX  Apply topically 2 (two) times daily.     fluocinonide-emollient 0.05 % Crea  Commonly known as:  FLUOCINONIDE-E     omeprazole 20 MG capsule  Commonly known as:  PRILOSEC  Take 1 capsule (20 mg total) by mouth once daily.     triamcinolone acetonide 0.1% 0.1 % cream  Commonly known as:  KENALOG     VITAMIN D3 1,000 unit Chew  Generic drug:  cholecalciferol (vitamin D3)           Where to Get Your Medications      You can get these medications from any pharmacy    Bring a paper prescription for each of these medications  · amLODIPine 5 MG tablet  · atorvastatin 20 MG tablet  You don't need a prescription for these medications  · aspirin 81 MG Chew         Follow-up Information     Jerome Steel, DO In 2 days.    Specialty:  Family Medicine  Why:  Return to emergency department for:  Weakness to 1 side, slurred speech, dizziness, vision loss, or other concerns.  Contact information:  [address] [address] [address] [phone]                        MIPS Measures     Smoker? No     Hypertension: Pre-hypertension/Hypertension: The pt has been informed that they may have pre-hypertension or hypertension based on a blood pressure reading in the ED. I recommend that the pt call the PCP listed on their discharge instructions or a physician of their choice this week to arrange f/u for further evaluation of possible pre-hypertension or hypertension.        Medical Decision Making         Additional MDM:     NIH Stroke Scale:   Interval = baseline  (upon arrival/admit)  Level of consciousness = 0 - alert  LOC questions = 0 - answers both correctly  LOC commands = 0 - performs both correctly  Best gaze = 0 - normal  Visual = 0 - no visual loss  Facial palsy = 0 - normal  Motor left arm =  0 - no drift  Motor right arm =  0 - no drift  Motor left leg = 0 - no drift  Motor right leg =  0 - no drift  Limb ataxia = 0 - absent  Sensory = 1 - mild to moderate loss  Best language = 0 - no aphasia  Dysarthria = 0 - normal articulation  Extinction and inattention = 0 - no neglect  NIH Stroke Scale Total = 1            Clinical Impression       ICD-10-CM ICD-9-CM   1. Elevated blood pressure reading R03.0 796.2   2. Arm numbness left R20.0 782.0   3. Numbness R20.0 782.0   4. Facial numbness R20.0 782.0       Disposition:   Disposition: Discharged  Condition: Stable               Gena Saldana, DO  01/16/19 1685

## 2019-01-21 NOTE — TELEPHONE ENCOUNTER
Return call to patient, patient states she was returning our call. Informed her that the only thing I seen was in regards to a referral for Neurology that was done on 01/18/19. Patient states she hope it was done, explain that it was done and if she would like for me to schedule an appointment. Patient states no she will handle it herself,because she was tired of this and hung up

## 2019-01-21 NOTE — TELEPHONE ENCOUNTER
Pt couldn't get appt at neuromedical until march or April. Pt called [facility] and got one today and is requesting referral be sent there. Can you sign new referral for me to send please.

## 2019-01-21 NOTE — TELEPHONE ENCOUNTER
----- Message from Yoli Dos Santos sent at 1/21/2019  9:56 AM CST -----  Contact: self  Pt states she's returning call. Please call pt back at [phone].      Thanks,   Yoli Dos Santos

## 2019-01-24 ENCOUNTER — LAB VISIT (OUTPATIENT)
Dept: LAB | Facility: HOSPITAL | Age: 69
End: 2019-01-24
Attending: STUDENT IN AN ORGANIZED HEALTH CARE EDUCATION/TRAINING PROGRAM
Payer: MEDICARE

## 2019-01-24 ENCOUNTER — INITIAL CONSULT (OUTPATIENT)
Dept: RHEUMATOLOGY | Facility: CLINIC | Age: 69
End: 2019-01-24
Payer: MEDICARE

## 2019-01-24 VITALS
DIASTOLIC BLOOD PRESSURE: 91 MMHG | WEIGHT: 135.38 LBS | SYSTOLIC BLOOD PRESSURE: 155 MMHG | HEART RATE: 65 BPM | HEIGHT: 60 IN | BODY MASS INDEX: 26.58 KG/M2

## 2019-01-24 DIAGNOSIS — Z11.4 ENCOUNTER FOR SCREENING FOR HIV: ICD-10-CM

## 2019-01-24 DIAGNOSIS — R76.8 ANA POSITIVE: ICD-10-CM

## 2019-01-24 DIAGNOSIS — R76.8 ANA POSITIVE: Primary | ICD-10-CM

## 2019-01-24 DIAGNOSIS — R68.89 OTHER GENERAL SYMPTOMS AND SIGNS: ICD-10-CM

## 2019-01-24 DIAGNOSIS — R20.2 PARESTHESIAS: ICD-10-CM

## 2019-01-24 DIAGNOSIS — Z01.89 ENCOUNTER FOR OTHER SPECIFIED SPECIAL EXAMINATIONS: ICD-10-CM

## 2019-01-24 LAB
C3 SERPL-MCNC: 114 MG/DL
C4 SERPL-MCNC: 28 MG/DL
CCP AB SER IA-ACNC: 1.7 U/ML
CK SERPL-CCNC: 76 U/L
LDH SERPL L TO P-CCNC: 182 U/L
RHEUMATOID FACT SERPL-ACNC: <10 IU/ML
TSH SERPL DL<=0.005 MIU/L-ACNC: 1.18 UIU/ML

## 2019-01-24 PROCEDURE — 83516 IMMUNOASSAY NONANTIBODY: CPT | Mod: 59

## 2019-01-24 PROCEDURE — 86255 FLUORESCENT ANTIBODY SCREEN: CPT | Mod: 91

## 2019-01-24 PROCEDURE — 83615 LACTATE (LD) (LDH) ENZYME: CPT

## 2019-01-24 PROCEDURE — 99999 PR PBB SHADOW E&M-EST. PATIENT-LVL III: ICD-10-PCS | Mod: PBBFAC,,, | Performed by: STUDENT IN AN ORGANIZED HEALTH CARE EDUCATION/TRAINING PROGRAM

## 2019-01-24 PROCEDURE — 84443 ASSAY THYROID STIM HORMONE: CPT

## 2019-01-24 PROCEDURE — 82595 ASSAY OF CRYOGLOBULIN: CPT

## 2019-01-24 PROCEDURE — 86703 HIV-1/HIV-2 1 RESULT ANTBDY: CPT

## 2019-01-24 PROCEDURE — 86431 RHEUMATOID FACTOR QUANT: CPT

## 2019-01-24 PROCEDURE — 86780 TREPONEMA PALLIDUM: CPT

## 2019-01-24 PROCEDURE — 86146 BETA-2 GLYCOPROTEIN ANTIBODY: CPT | Mod: 59

## 2019-01-24 PROCEDURE — 85613 RUSSELL VIPER VENOM DILUTED: CPT

## 2019-01-24 PROCEDURE — 82085 ASSAY OF ALDOLASE: CPT

## 2019-01-24 PROCEDURE — 99213 OFFICE O/P EST LOW 20 MIN: CPT | Mod: PBBFAC,PN | Performed by: STUDENT IN AN ORGANIZED HEALTH CARE EDUCATION/TRAINING PROGRAM

## 2019-01-24 PROCEDURE — 86235 NUCLEAR ANTIGEN ANTIBODY: CPT | Mod: 59

## 2019-01-24 PROCEDURE — 99204 PR OFFICE/OUTPT VISIT, NEW, LEVL IV, 45-59 MIN: ICD-10-PCS | Mod: S$PBB,,, | Performed by: STUDENT IN AN ORGANIZED HEALTH CARE EDUCATION/TRAINING PROGRAM

## 2019-01-24 PROCEDURE — 86235 NUCLEAR ANTIGEN ANTIBODY: CPT

## 2019-01-24 PROCEDURE — 80074 ACUTE HEPATITIS PANEL: CPT

## 2019-01-24 PROCEDURE — 82550 ASSAY OF CK (CPK): CPT

## 2019-01-24 PROCEDURE — 83516 IMMUNOASSAY NONANTIBODY: CPT

## 2019-01-24 PROCEDURE — 86147 CARDIOLIPIN ANTIBODY EA IG: CPT

## 2019-01-24 PROCEDURE — 86593 SYPHILIS TEST NON-TREP QUANT: CPT

## 2019-01-24 PROCEDURE — 86592 SYPHILIS TEST NON-TREP QUAL: CPT

## 2019-01-24 PROCEDURE — 36415 COLL VENOUS BLD VENIPUNCTURE: CPT

## 2019-01-24 PROCEDURE — 85598 HEXAGNAL PHOSPH PLTLT NEUTRL: CPT

## 2019-01-24 PROCEDURE — 86160 COMPLEMENT ANTIGEN: CPT | Mod: 59

## 2019-01-24 PROCEDURE — 86225 DNA ANTIBODY NATIVE: CPT

## 2019-01-24 PROCEDURE — 86160 COMPLEMENT ANTIGEN: CPT

## 2019-01-24 PROCEDURE — 99204 OFFICE O/P NEW MOD 45 MIN: CPT | Mod: S$PBB,,, | Performed by: STUDENT IN AN ORGANIZED HEALTH CARE EDUCATION/TRAINING PROGRAM

## 2019-01-24 PROCEDURE — 86200 CCP ANTIBODY: CPT

## 2019-01-24 PROCEDURE — 99999 PR PBB SHADOW E&M-EST. PATIENT-LVL III: CPT | Mod: PBBFAC,,, | Performed by: STUDENT IN AN ORGANIZED HEALTH CARE EDUCATION/TRAINING PROGRAM

## 2019-01-24 ASSESSMENT — ROUTINE ASSESSMENT OF PATIENT INDEX DATA (RAPID3): MDHAQ FUNCTION SCORE: 0

## 2019-01-24 NOTE — LETTER
January 29, 2019      Jerome Steel [place]  [address] [address] [address]           [facility] - Rheumatology  [address] [facility] [address]  Phone: [phone]  Fax: [phone]          Patient: Rissa Garcia   MR Number: [id number]   YOB: 1950   Date of Visit: 1/24/2019       Dear Dr. Jerome Steel:    Thank you for referring Rissa Garcia to me for evaluation. Attached you will find relevant portions of my assessment and plan of care.    If you have questions, please do not hesitate to call me. I look forward to following Rissa Garcia along with you.    Sincerely,    Randolph Rios  CC:  No Recipients    If you would like to receive this communication electronically, please contact [email or web address] or [phone] to request more information on [employer] Link access.    For providers and/or their staff who would like to refer a patient to [facility], please contact us through our one-stop-shop provider referral line, Benitez Mcmanus, at [phone].    If you feel you have received this communication in error or would no longer like to receive these types of communications, please e-mail [email or web address]

## 2019-01-25 LAB
ACE SERPL-CCNC: 30 U/L
ALDOLASE SERPL-CCNC: 3.1 U/L
ANTI SM/RNP ANTIBODY: 1.54 EU
ANTI-SM/RNP INTERPRETATION: NEGATIVE
CARDIOLIPIN IGG SER IA-ACNC: 13.96 GPL
CARDIOLIPIN IGM SER IA-ACNC: 10.85 MPL
DSDNA AB SER-ACNC: NORMAL [IU]/ML
ENA SCL70 IGG SER IA-ACNC: <0.2 U
HAV IGM SERPL QL IA: NEGATIVE
HBV CORE IGM SERPL QL IA: NEGATIVE
HBV SURFACE AG SERPL QL IA: NEGATIVE
HCV AB SERPL QL IA: NEGATIVE
HIV 1+2 AB+HIV1 P24 AG SERPL QL IA: NEGATIVE
PROTEINASE3 IGG SER-ACNC: <0.2 U
RPR SER QL: REACTIVE
RPR SER-TITR: ABNORMAL {TITER}

## 2019-01-26 ENCOUNTER — PATIENT MESSAGE (OUTPATIENT)
Dept: RHEUMATOLOGY | Facility: CLINIC | Age: 69
End: 2019-01-26

## 2019-01-27 LAB
B2 GLYCOPROT1 IGA SER QL: <9 SAU
B2 GLYCOPROT1 IGG SER QL: 37 SGU
B2 GLYCOPROT1 IGM SER QL: <9 SMU

## 2019-01-28 LAB
ANCA AB TITR SER IF: NORMAL TITER
APTT HEX PL PPP: POSITIVE S
ENA SCL70 AB SER-ACNC: 5 UNITS
MYELOPEROXIDASE AB SER-ACNC: 4 UNITS
P-ANCA TITR SER IF: NORMAL TITER
T PALLIDUM AB SER QL IF: NORMAL

## 2019-01-29 LAB — LA PPP-IMP: POSITIVE

## 2019-02-04 LAB — CRYOGLOB SER QL: NORMAL

## 2019-02-06 LAB — RNA POLYMERASE III ANTIBODIES, IGG, SERUM: <10 U

## 2019-02-07 ENCOUNTER — PATIENT MESSAGE (OUTPATIENT)
Dept: RHEUMATOLOGY | Facility: CLINIC | Age: 69
End: 2019-02-07

## 2019-02-07 ENCOUNTER — TELEPHONE (OUTPATIENT)
Dept: RHEUMATOLOGY | Facility: CLINIC | Age: 69
End: 2019-02-07

## 2019-02-07 ENCOUNTER — PATIENT MESSAGE (OUTPATIENT)
Dept: INTERNAL MEDICINE | Facility: CLINIC | Age: 69
End: 2019-02-07

## 2019-02-07 LAB
ANTI-PM/SCL AB: <20 UNITS
ANTI-SS-A 52 KD AB, IGG: <20 UNITS
EJ AB SER QL: NEGATIVE
ENA JO1 AB SER IA-ACNC: <20 UNITS
ENA SM+RNP AB SER IA-ACNC: <20 UNITS
FIBRILLARIN (U3 RNP): NEGATIVE
KU AB SER QL: NEGATIVE
MDA-5 (P140): <20 UNITS
MI2 AB SER QL: NEGATIVE
NXP-2 (P140): <20 UNITS
OJ AB SER QL: NEGATIVE
PL12 AB SER QL: NEGATIVE
PL7 AB SER QL: NEGATIVE
SRP AB SERPL QL: NEGATIVE
TIF1 GAMMA (P155/140): <20 UNITS
U2 SNRNP: NEGATIVE

## 2019-02-08 ENCOUNTER — PATIENT MESSAGE (OUTPATIENT)
Dept: RHEUMATOLOGY | Facility: CLINIC | Age: 69
End: 2019-02-08

## 2019-02-08 DIAGNOSIS — R76.8 ANA POSITIVE: Primary | ICD-10-CM

## 2019-02-08 RX ORDER — MUPIROCIN 20 MG/G
OINTMENT TOPICAL 3 TIMES DAILY
Qty: 1 TUBE | Refills: 0 | Status: SHIPPED | OUTPATIENT
Start: 2019-02-08 | End: 2020-08-25 | Stop reason: ALTCHOICE

## 2019-02-17 ENCOUNTER — PATIENT MESSAGE (OUTPATIENT)
Dept: INTERNAL MEDICINE | Facility: CLINIC | Age: 69
End: 2019-02-17

## 2019-02-18 ENCOUNTER — PATIENT MESSAGE (OUTPATIENT)
Dept: INTERNAL MEDICINE | Facility: CLINIC | Age: 69
End: 2019-02-18

## 2019-02-18 NOTE — PROGRESS NOTES
RHEUMATOLOGY OUTPATIENT CLINIC NOTE      Attending Rheumatologist: Jennifer Snyder  Primary Care Provider: Jerome Steel DO   Physician Requesting Consultation: Jerome Steel DO  [address]  [place] [address]  Chief Complaint/Reason For Consultation:  +LILIANE      Subjective:       HPI  Rissa Garcia is a 68 y.o. White female presents for evaluation +LILIANE  -Reports that she began feeling numbness on left side of ehr mouth x several weeks, subsequently spread to left arm and foot. Denies any weakness, slurred speech, gait issues, or speech changes. Went to [facility] and had MRI brain that showed no acute stroke. Today reports still some occasional numbness around her left mouth. Denies any arm or leg pain. Hx chronic neck pain, recently completed PT. Denies any hx photosensitivity, raynauds, blood clots, miscarriages, sob, fevers, pleurisy, oral ulcers. No other acute issues or joint pains.     14pt ros negative except as otherwise stated above     Review of Systems   Constitutional: Positive for malaise/fatigue. Negative for chills and fever.   HENT: Negative for congestion, hearing loss and sore throat.    Eyes: Negative for blurred vision, photophobia and redness.   Respiratory: Negative for cough, sputum production and wheezing.    Cardiovascular: Negative for chest pain, claudication and leg swelling.   Gastrointestinal: Negative for abdominal pain, blood in stool and heartburn.   Genitourinary: Negative for dysuria and frequency.   Musculoskeletal: Positive for joint pain, myalgias and neck pain.   Skin: Negative for itching and rash.   Neurological: Negative for dizziness, focal weakness and headaches.   Endo/Heme/Allergies: Negative for polydipsia. Does not bruise/bleed easily.       Chronic comorbid conditions affecting medical decision making today:  Past Medical History:   Diagnosis Date    Osteoporosis     [facility] 9/15     Past Surgical History:   Procedure Laterality Date     BREAST BIOPSY       Family History   Problem Relation Age of Onset    Diabetes Mother     Osteoporosis Mother     Heart disease Father     Diabetes Brother     Sleep apnea Brother     Cataracts Maternal Aunt      Social History     Substance and Sexual Activity   Alcohol Use No     Social History     Tobacco Use   Smoking Status Never Smoker   Smokeless Tobacco Never Used     Social History     Substance and Sexual Activity   Drug Use No       Current Outpatient Medications:     alendronate (FOSAMAX) 70 MG tablet, TAKE 1 TABLET ONCE WEEKLY IN THE MORNING WITH A FULL GLASS OF WATER ON AN EMPTY STOMACH. DO NOT LIE DOWN FOR AT LEAST 30 MINUTES AFTERWARDS., Disp: 12 tablet, Rfl: 1    omeprazole (PRILOSEC) 20 MG capsule, Take 1 capsule (20 mg total) by mouth once daily., Disp: 90 capsule, Rfl: 1    triamcinolone acetonide 0.1% (KENALOG) 0.1 % cream, Apply topically 2 (two) times daily., Disp: , Rfl:     amLODIPine (NORVASC) 5 MG tablet, Take 1 tablet (5 mg total) by mouth once daily., Disp: 30 tablet, Rfl: 0    aspirin 81 MG Chew, Take 1 tablet (81 mg total) by mouth once daily., Disp: , Rfl: 0    atorvastatin (LIPITOR) 20 MG tablet, Take 1 tablet (20 mg total) by mouth once daily., Disp: 30 tablet, Rfl: 0    cholecalciferol, vitamin D3, (VITAMIN D3) 1,000 unit Chew, Take 2 tabs daily, Disp: , Rfl:     fluocinonide 0.05% (LIDEX) 0.05 % cream, Apply topically 2 (two) times daily., Disp: 120 g, Rfl: 1    fluocinonide-emollient (FLUOCINONIDE-E) 0.05 % Crea, Apply topically., Disp: , Rfl:     mupirocin (BACTROBAN) 2 % ointment, Apply topically 3 (three) times daily., Disp: 1 Tube, Rfl: 0       Objective:         Vitals:    01/24/19 0918   BP: (!) 155/91   Pulse: 65       Physical Exam   Nursing note and vitals reviewed.  Constitutional: She is oriented to person, place, and time and well-developed, well-nourished, and in no distress. No distress.   HENT:   Head: Normocephalic and atraumatic.   Right Ear:  External ear normal.   Left Ear: External ear normal.   Eyes: Conjunctivae and EOM are normal. Pupils are equal, round, and reactive to light.   Neck: Normal range of motion. Neck supple.   Cardiovascular: Normal rate, regular rhythm and normal heart sounds.    Pulmonary/Chest: Effort normal and breath sounds normal. No respiratory distress. She has no wheezes.   Abdominal: Soft. Bowel sounds are normal.   Neurological: She is alert and oriented to person, place, and time.   Skin: Skin is warm and dry. No rash noted.     Psychiatric: Affect and judgment normal.   Musculoskeletal: Normal range of motion. No synovitis in small joints of hands and feet        Reviewed old and all outside pertinent medical records available.    All lab results personally reviewed and interpreted by me.  Lab Results   Component Value Date    WBC 5.06 01/16/2019    HGB 13.6 01/16/2019    HCT 40.6 01/16/2019    MCV 89 01/16/2019    MCH 30.0 01/16/2019    MCHC 33.5 01/16/2019    RDW 14.1 01/16/2019     01/16/2019    MPV 10.2 01/16/2019       Lab Results   Component Value Date     01/16/2019    K 4.0 01/16/2019     01/16/2019    CO2 21 (L) 01/16/2019     01/16/2019    BUN 12 01/16/2019    CALCIUM 9.4 01/16/2019    PROT 7.2 01/16/2019    ALBUMIN 3.9 01/16/2019    BILITOT 0.5 01/16/2019    AST 19 01/16/2019    ALKPHOS 77 01/16/2019    ALT 21 01/16/2019       Lab Results   Component Value Date    COLORU Yellow 01/24/2019    APPEARANCEUA Clear 01/24/2019    SPECGRAV 1.015 01/24/2019    PHUR 6.0 01/24/2019    PROTEINUA Negative 01/24/2019    KETONESU Negative 01/24/2019    LEUKOCYTESUR Negative 01/24/2019    NITRITE Negative 01/24/2019    UROBILINOGEN Negative 09/06/2016       Lab Results   Component Value Date    CRP 1.9 01/16/2019       Lab Results   Component Value Date    SEDRATE 21 01/16/2019       Lab Results   Component Value Date    RF <10.0 01/24/2019    SEDRATE 21 01/16/2019       No components found for:  25OHVITDTOT, 50ZFAOZE0, 20TTBLQT3, METHODNOTE    Lab Results   Component Value Date    URICACID 3.1 08/17/2015       No components found for: TSPOTTB    LILIANE 1:640 centromere    Imaging:  All imaging reviewed and independently  interpreted by me.    MRI Brain 1/16/19  COMPARISON:  CT brain 01/16/2019.    FINDINGS:  The ventricles are mildly enlarged as are the extra-axial CSF spaces.    Several small scattered white matter hyperintense T2 signal foci are present consistent with age-related small vessel ischemic degeneration.    The diffusion-weighted sequence is negative.  No evidence of acute infarction.    No acute hemorrhage or extra-axial fluid collection.    Pituitary gland region appears normal.    The diffusion sequence is negative.      Impression       No acute findings.  Mild cerebral and cerebellar volume loss with associated white matter degeneration.  Otherwise negative.     Hand XR 2015    Findings: Three views both hands.  There is generalized osteopenia and multi-articular degenerative change variably involving the CMC, MCP, and IP joints.  No definite periarticular erosions identified.  No acute fracture or dislocation.      Impression      As above.          ASSESSMENT / PLAN:     Rissa Garcia is a 68 y.o. White female with:      1. LILIANE positive  -LILIANE 1:640 centromere; TOBI negative  -No other features suggestive of underlying connective tissue disease / scleroderma. Will complete workup as below for further evaluation  -Reassess after reviewing workup    - Anti-DNA antibody, double-stranded; Standing  - C3 complement; Standing  - C4 complement; Standing  - Urinalysis; Standing  - Protein / creatinine ratio, urine; Future  - Rheumatoid factor; Future  - Cyclic citrul peptide antibody, IgG; Future  - Anti-scleroderma antibody; Future  - RNA polymerase III Ab, IgG; Future  - PM-Scl Antibody by Immunodiffusion; Future  - Anti Sm/RNP Antibody; Future  - Th/To Antibody; Future  - MyoMarker Panel 3;  Future  - DRVVT; Standing  - Cardiolipin antibody; Standing  - Beta-2 glycoprotein antibodies; Standing  - CK; Standing  - TSH; Future  - Angiotensin converting enzyme; Future  - RPR; Future  - FTA antibodies, IgG and IgM; Future  - HIV 1/2 Ag/Ab (4th Gen); Future  - Anti-neutrophilic cytoplasmic antibody; Future  - Cryoglobulin; Standing  - Proteinase 3 Autoantibodies; Future  - Myeloperoxidase Antibody (MPO); Future  - Hepatitis panel, acute; Future  - Quantiferon Gold TB; Future  - Lactate dehydrogenase; Future  - Aldolase; Future    2. Paresthesias  -Paresthesias involving the left side of her face, arm and foot. PE unremarkable. Recent MRI brain, r/o for CVA  -Following w neurology who are planning MRI C spine r/o radiculopathy,  NCS/EMG LUE/LLE, and carotid ultrasound.       Method of contact with patient concerns: [facility]      Jennifer Snyder M.D.  Rheumatology Department   [facility] [place] [address], [place] [address]  Phone: [phone]  Fax: [phone]

## 2019-03-07 LAB — TH/TO: NORMAL

## 2019-03-20 ENCOUNTER — PATIENT MESSAGE (OUTPATIENT)
Dept: INTERNAL MEDICINE | Facility: CLINIC | Age: 69
End: 2019-03-20

## 2019-03-31 ENCOUNTER — PATIENT MESSAGE (OUTPATIENT)
Dept: INTERNAL MEDICINE | Facility: CLINIC | Age: 69
End: 2019-03-31

## 2019-04-06 ENCOUNTER — PATIENT MESSAGE (OUTPATIENT)
Dept: INTERNAL MEDICINE | Facility: CLINIC | Age: 69
End: 2019-04-06

## 2019-04-14 ENCOUNTER — PATIENT MESSAGE (OUTPATIENT)
Dept: RHEUMATOLOGY | Facility: CLINIC | Age: 69
End: 2019-04-14

## 2019-04-15 DIAGNOSIS — R20.2 PARESTHESIA: Primary | ICD-10-CM

## 2019-04-15 DIAGNOSIS — G40.109 SPECIAL SENSORY ATTACKS: ICD-10-CM

## 2019-04-15 DIAGNOSIS — G56.00 CARPAL TUNNEL SYNDROME: ICD-10-CM

## 2019-04-15 DIAGNOSIS — G45.9 INTERMITTENT CEREBRAL ISCHEMIA: ICD-10-CM

## 2019-04-15 DIAGNOSIS — D68.62 LUPUS ANTICOAGULANT DISORDER: ICD-10-CM

## 2019-04-16 ENCOUNTER — TELEPHONE (OUTPATIENT)
Dept: RHEUMATOLOGY | Facility: CLINIC | Age: 69
End: 2019-04-16

## 2019-04-16 NOTE — TELEPHONE ENCOUNTER
----- Message from Jossie Rosario sent at 4/16/2019 11:26 AM CDT -----  Contact: pt  The pt request a call concerning a message sent through "[employer]", no additional info can be reached at [phone]///thxMW

## 2019-04-16 NOTE — TELEPHONE ENCOUNTER
Dr. Snyder, I spoke with Mrs. Garcia today. Please read below message that I forward to you on yesterday      My Neurologist wants me to follow up with you and to forward him the repeat labs. Fax [phone].  Can you let me know what labs to be done so I can have the blood work done in [place] in the next week or 2.  Thank you.

## 2019-04-17 ENCOUNTER — PATIENT MESSAGE (OUTPATIENT)
Dept: RHEUMATOLOGY | Facility: CLINIC | Age: 69
End: 2019-04-17

## 2019-04-23 ENCOUNTER — LAB VISIT (OUTPATIENT)
Dept: LAB | Facility: HOSPITAL | Age: 69
End: 2019-04-23
Attending: INTERNAL MEDICINE
Payer: MEDICARE

## 2019-04-23 DIAGNOSIS — E11.9 DM (DIABETES MELLITUS): ICD-10-CM

## 2019-04-23 DIAGNOSIS — I25.10 CAD (CORONARY ARTERY DISEASE): Primary | ICD-10-CM

## 2019-04-23 DIAGNOSIS — I25.10 CAD (CORONARY ARTERY DISEASE): ICD-10-CM

## 2019-04-23 LAB
ALBUMIN SERPL BCP-MCNC: 4.2 G/DL (ref 3.5–5.2)
ALP SERPL-CCNC: 75 U/L (ref 55–135)
ALT SERPL W/O P-5'-P-CCNC: 33 U/L (ref 10–44)
AST SERPL-CCNC: 24 U/L (ref 10–40)
BILIRUB DIRECT SERPL-MCNC: 0.2 MG/DL (ref 0.1–0.3)
BILIRUB SERPL-MCNC: 0.4 MG/DL (ref 0.1–1)
GLUCOSE SERPL-MCNC: 112 MG/DL (ref 70–110)
PROT SERPL-MCNC: 7.7 G/DL (ref 6–8.4)
T3FREE SERPL-MCNC: 2.4 PG/ML (ref 2.3–4.2)
T4 FREE SERPL-MCNC: 1.14 NG/DL (ref 0.71–1.51)
TSH SERPL DL<=0.005 MIU/L-ACNC: 1.32 UIU/ML (ref 0.4–4)

## 2019-04-23 PROCEDURE — 85366 FIBRINOGEN TEST: CPT

## 2019-04-23 PROCEDURE — 85525 HEPARIN NEUTRALIZATION: CPT

## 2019-04-23 PROCEDURE — 85384 FIBRINOGEN ACTIVITY: CPT

## 2019-04-23 PROCEDURE — 30000890 HC MISC. SEND OUT TEST

## 2019-04-23 PROCEDURE — 84439 ASSAY OF FREE THYROXINE: CPT | Mod: PO

## 2019-04-23 PROCEDURE — 80076 HEPATIC FUNCTION PANEL: CPT | Mod: PO

## 2019-04-23 PROCEDURE — 85379 FIBRIN DEGRADATION QUANT: CPT

## 2019-04-23 PROCEDURE — 85270 CLOT FACTOR XI PTA: CPT

## 2019-04-23 PROCEDURE — 84481 FREE ASSAY (FT-3): CPT

## 2019-04-23 PROCEDURE — 85305 CLOT INHIBIT PROT S TOTAL: CPT

## 2019-04-23 PROCEDURE — 85300 ANTITHROMBIN III ACTIVITY: CPT

## 2019-04-23 PROCEDURE — 82947 ASSAY GLUCOSE BLOOD QUANT: CPT | Mod: PO

## 2019-04-23 PROCEDURE — 84443 ASSAY THYROID STIM HORMONE: CPT | Mod: PO

## 2019-04-23 PROCEDURE — 85670 THROMBIN TIME PLASMA: CPT

## 2019-04-23 PROCEDURE — 85250 CLOT FACTOR IX PTC/CHRSTMAS: CPT

## 2019-04-23 PROCEDURE — 85280 CLOT FACTOR XII HAGEMAN: CPT

## 2019-04-23 PROCEDURE — 85303 CLOT INHIBIT PROT C ACTIVITY: CPT

## 2019-04-23 PROCEDURE — 85610 PROTHROMBIN TIME: CPT

## 2019-04-23 PROCEDURE — 85732 THROMBOPLASTIN TIME PARTIAL: CPT

## 2019-04-23 PROCEDURE — 85613 RUSSELL VIPER VENOM DILUTED: CPT

## 2019-04-26 ENCOUNTER — PATIENT MESSAGE (OUTPATIENT)
Dept: RHEUMATOLOGY | Facility: CLINIC | Age: 69
End: 2019-04-26

## 2019-04-26 DIAGNOSIS — R89.9 ABNORMAL LABORATORY TEST: Primary | ICD-10-CM

## 2019-04-29 ENCOUNTER — LAB VISIT (OUTPATIENT)
Dept: LAB | Facility: HOSPITAL | Age: 69
End: 2019-04-29
Attending: STUDENT IN AN ORGANIZED HEALTH CARE EDUCATION/TRAINING PROGRAM
Payer: MEDICARE

## 2019-04-29 ENCOUNTER — PATIENT MESSAGE (OUTPATIENT)
Dept: RHEUMATOLOGY | Facility: CLINIC | Age: 69
End: 2019-04-29

## 2019-04-29 DIAGNOSIS — R89.9 ABNORMAL LABORATORY TEST: ICD-10-CM

## 2019-04-29 PROCEDURE — 36415 COLL VENOUS BLD VENIPUNCTURE: CPT | Mod: PO

## 2019-04-29 PROCEDURE — 85598 HEXAGNAL PHOSPH PLTLT NEUTRL: CPT

## 2019-04-29 PROCEDURE — 85613 RUSSELL VIPER VENOM DILUTED: CPT

## 2019-04-29 NOTE — TELEPHONE ENCOUNTER
Spoke with Alan Jose she stated that the attach messages was not for Dr. Snyder, the messages was meant for Dr. Steel. Mrs. Garcia stated that she will contact Dr. Steel office.

## 2019-04-30 DIAGNOSIS — G40.109 SPECIAL SENSORY ATTACKS: Primary | ICD-10-CM

## 2019-05-01 ENCOUNTER — LAB VISIT (OUTPATIENT)
Dept: LAB | Facility: HOSPITAL | Age: 69
End: 2019-05-01
Attending: PSYCHIATRY & NEUROLOGY
Payer: MEDICARE

## 2019-05-01 DIAGNOSIS — G40.109 SPECIAL SENSORY ATTACKS: ICD-10-CM

## 2019-05-01 LAB
CREAT SERPL-MCNC: 1 MG/DL (ref 0.5–1.4)
EST. GFR  (AFRICAN AMERICAN): >60 ML/MIN/1.73 M^2
EST. GFR  (NON AFRICAN AMERICAN): 57.6 ML/MIN/1.73 M^2

## 2019-05-01 PROCEDURE — 82565 ASSAY OF CREATININE: CPT | Mod: PO

## 2019-05-01 PROCEDURE — 36415 COLL VENOUS BLD VENIPUNCTURE: CPT | Mod: PO

## 2019-05-06 LAB
APTT HEX PL PPP: POSITIVE S
LA PPP-IMP: POSITIVE

## 2019-05-07 DIAGNOSIS — Z12.11 COLON CANCER SCREENING: ICD-10-CM

## 2019-05-09 ENCOUNTER — APPOINTMENT (OUTPATIENT)
Dept: LAB | Facility: HOSPITAL | Age: 69
End: 2019-05-09
Attending: FAMILY MEDICINE
Payer: MEDICARE

## 2019-05-10 ENCOUNTER — PATIENT MESSAGE (OUTPATIENT)
Dept: RHEUMATOLOGY | Facility: CLINIC | Age: 69
End: 2019-05-10

## 2019-05-10 ENCOUNTER — TELEPHONE (OUTPATIENT)
Dept: RHEUMATOLOGY | Facility: CLINIC | Age: 69
End: 2019-05-10

## 2019-05-10 NOTE — TELEPHONE ENCOUNTER
----- Message from Manuel Bourgeois sent at 5/10/2019  4:33 PM CDT -----  Contact: pt   Pt received portal message that contact number wasn't working,verified correct number.         ..[phone] (home)

## 2019-06-07 ENCOUNTER — TELEPHONE (OUTPATIENT)
Dept: RHEUMATOLOGY | Facility: CLINIC | Age: 69
End: 2019-06-07

## 2019-06-07 NOTE — TELEPHONE ENCOUNTER
Returned pt call pt stated she did not want to talk to me she wanted to talk to you!!! Please call her thanks

## 2019-06-07 NOTE — TELEPHONE ENCOUNTER
----- Message from Binu Echeverria sent at 6/7/2019  4:52 PM CDT -----  Contact: [phone]  .Type:  Patient Returning Call    Who Called: Patient  Who Left Message for Patient:  Does the patient know what this is regarding?:unsure  Would the patient rather a call back or a response via [facility]? Call back   Best Call Back Number:[phone]  Additional Information:

## 2019-06-07 NOTE — TELEPHONE ENCOUNTER
----- Message from Britney Canchola sent at 6/7/2019  1:29 PM CDT -----  Contact: pt   Pt is calling in regards to some blood work and opinion on something.     .[phone] (home)

## 2019-06-10 ENCOUNTER — TELEPHONE (OUTPATIENT)
Dept: RHEUMATOLOGY | Facility: CLINIC | Age: 69
End: 2019-06-10

## 2019-06-10 DIAGNOSIS — M81.0 OSTEOPOROSIS: ICD-10-CM

## 2019-06-10 RX ORDER — ALENDRONATE SODIUM 70 MG/1
TABLET ORAL
Qty: 12 TABLET | Refills: 1 | Status: SHIPPED | OUTPATIENT
Start: 2019-06-10 | End: 2019-12-08 | Stop reason: SDUPTHER

## 2019-06-10 NOTE — TELEPHONE ENCOUNTER
----- Message from Jossie Rosario sent at 6/10/2019 10:07 AM CDT -----  Contact: pt  Type:  Patient Returning Call    Who Called: the pt   Who Left Message for Patient: unknown  Does the patient know what this is regarding?: no  Would the patient rather a call back or a response via [facility]? Call back  Best Call Back Number: [phone]  Additional Information: The pt request a call from  only.

## 2019-06-10 NOTE — TELEPHONE ENCOUNTER
Spoke with  Jose she states that she would like Dr. Snyder to call her back. She only want to speak with Dr. Snyder

## 2019-06-10 NOTE — TELEPHONE ENCOUNTER
Spoke with Mrs. Garcia informed her that Dr. Snyder was in clinic and if there a question I can relay to her so she can better assist you when returning your call. Mrs. Garcia stated that she will tell me what she want and she only want to speak to Dr. Snyder. I also asked Mrs. Garcia if she would like me to schedule her a sooner appointment with Dr. Snyder to discuss and answer any questions that she may have, Mrs. Garcia stated that  she has to many doctor appointments with to many different doctors and she is not a sick person so do not wishes to schedule a earlier appointment

## 2019-06-13 ENCOUNTER — HOSPITAL ENCOUNTER (OUTPATIENT)
Dept: RADIOLOGY | Facility: HOSPITAL | Age: 69
Discharge: HOME | End: 2019-06-13
Attending: PSYCHIATRY & NEUROLOGY
Payer: MEDICARE

## 2019-06-13 DIAGNOSIS — M54.2 NECK PAIN: ICD-10-CM

## 2019-06-13 PROCEDURE — 72050 XR CERVICAL SPINE COMPLETE 5 VIEW: ICD-10-PCS | Mod: 26,,, | Performed by: RADIOLOGY

## 2019-06-13 PROCEDURE — 72050 X-RAY EXAM NECK SPINE 4/5VWS: CPT | Mod: TC,PO

## 2019-06-13 PROCEDURE — 72050 X-RAY EXAM NECK SPINE 4/5VWS: CPT | Mod: 26,,, | Performed by: RADIOLOGY

## 2019-06-15 ENCOUNTER — PATIENT MESSAGE (OUTPATIENT)
Dept: RHEUMATOLOGY | Facility: CLINIC | Age: 69
End: 2019-06-15

## 2019-06-15 ENCOUNTER — PATIENT MESSAGE (OUTPATIENT)
Dept: INTERNAL MEDICINE | Facility: CLINIC | Age: 69
End: 2019-06-15

## 2019-08-01 ENCOUNTER — TELEPHONE (OUTPATIENT)
Dept: RHEUMATOLOGY | Facility: CLINIC | Age: 69
End: 2019-08-01

## 2019-08-02 NOTE — TELEPHONE ENCOUNTER
Returned pt call. Discussed extensively pts new concerns. Will revisit discussion regarding role of anti-coagulation at rtc. Will obtain records from neurology w/ MRI cervical and EMGs

## 2019-09-04 ENCOUNTER — TELEPHONE (OUTPATIENT)
Dept: RHEUMATOLOGY | Facility: CLINIC | Age: 69
End: 2019-09-04

## 2019-09-04 NOTE — TELEPHONE ENCOUNTER
Left voice message stating I was calling as a courtesy reminder that you have an appointment scheduled with Dr. Snyder on tomorrow

## 2019-09-05 ENCOUNTER — OFFICE VISIT (OUTPATIENT)
Dept: RHEUMATOLOGY | Facility: CLINIC | Age: 69
End: 2019-09-05
Payer: MEDICARE

## 2019-09-05 VITALS
SYSTOLIC BLOOD PRESSURE: 123 MMHG | WEIGHT: 121.5 LBS | BODY MASS INDEX: 23.85 KG/M2 | HEART RATE: 89 BPM | HEIGHT: 60 IN | DIASTOLIC BLOOD PRESSURE: 82 MMHG

## 2019-09-05 DIAGNOSIS — D68.61 ANTIPHOSPHOLIPID SYNDROME: Primary | ICD-10-CM

## 2019-09-05 PROCEDURE — 99999 PR PBB SHADOW E&M-EST. PATIENT-LVL III: CPT | Mod: PBBFAC,,, | Performed by: STUDENT IN AN ORGANIZED HEALTH CARE EDUCATION/TRAINING PROGRAM

## 2019-09-05 PROCEDURE — 99999 PR PBB SHADOW E&M-EST. PATIENT-LVL III: ICD-10-PCS | Mod: PBBFAC,,, | Performed by: STUDENT IN AN ORGANIZED HEALTH CARE EDUCATION/TRAINING PROGRAM

## 2019-09-05 PROCEDURE — 99214 OFFICE O/P EST MOD 30 MIN: CPT | Mod: S$PBB,,, | Performed by: STUDENT IN AN ORGANIZED HEALTH CARE EDUCATION/TRAINING PROGRAM

## 2019-09-05 PROCEDURE — 99214 PR OFFICE/OUTPT VISIT, EST, LEVL IV, 30-39 MIN: ICD-10-PCS | Mod: S$PBB,,, | Performed by: STUDENT IN AN ORGANIZED HEALTH CARE EDUCATION/TRAINING PROGRAM

## 2019-09-05 PROCEDURE — 99213 OFFICE O/P EST LOW 20 MIN: CPT | Mod: PBBFAC | Performed by: STUDENT IN AN ORGANIZED HEALTH CARE EDUCATION/TRAINING PROGRAM

## 2019-09-05 RX ORDER — LEVETIRACETAM 500 MG/1
TABLET, EXTENDED RELEASE ORAL
Refills: 2 | COMMUNITY
Start: 2019-06-20

## 2019-09-09 NOTE — PROGRESS NOTES
RHEUMATOLOGY OUTPATIENT CLINIC NOTE      Attending Rheumatologist: Jennifer Snyder  Primary Care Provider: Jerome Steel DO   Physician Requesting Consultation: No referring provider defined for this encounter.  Chief Complaint/Reason For Consultation:  +LILIANE      Subjective:       HPI  Historical:  Rissa Garcia is a 69 y.o. White female presents for evaluation +LILIANE  -Reports that she began feeling numbness on left side of ehr mouth x several weeks, subsequently spread to left arm and foot. Denies any weakness, slurred speech, gait issues, or speech changes. Went to [facility] and had MRI brain that showed no acute stroke. Today reports still some occasional numbness around her left mouth. Denies any arm or leg pain. Hx chronic neck pain, recently completed PT. Denies any hx photosensitivity, raynauds, blood clots, miscarriages, sob, fevers, pleurisy, oral ulcers. No other acute issues or joint pains.     Today:  Since last visit, pt reports overall feeling well. No further recurrence of initial symptoms. No new complaints or issues.     14pt ros negative except as otherwise stated above     Review of Systems   Constitutional: Positive for malaise/fatigue. Negative for chills and fever.   HENT: Negative for congestion, hearing loss and sore throat.    Eyes: Negative for blurred vision, photophobia and redness.   Respiratory: Negative for cough, sputum production and wheezing.    Cardiovascular: Negative for chest pain, claudication and leg swelling.   Gastrointestinal: Negative for abdominal pain, blood in stool and heartburn.   Genitourinary: Negative for dysuria and frequency.   Musculoskeletal: Positive for joint pain, myalgias and neck pain.   Skin: Negative for itching and rash.   Neurological: Negative for dizziness, focal weakness and headaches.   Endo/Heme/Allergies: Negative for polydipsia. Does not bruise/bleed easily.       Chronic comorbid conditions affecting medical decision making today:  Past  Medical History:   Diagnosis Date    Osteoporosis     via [facility] dexa 9/15     Past Surgical History:   Procedure Laterality Date    BREAST BIOPSY      COLONOSCOPY  08/20/2019    removed polyps     Family History   Problem Relation Age of Onset    Diabetes Mother     Osteoporosis Mother     Heart disease Father     Diabetes Brother     Sleep apnea Brother     Cataracts Maternal Aunt      Social History     Substance and Sexual Activity   Alcohol Use No     Social History     Tobacco Use   Smoking Status Never Smoker   Smokeless Tobacco Never Used     Social History     Substance and Sexual Activity   Drug Use No       Current Outpatient Medications:     alendronate (FOSAMAX) 70 MG tablet, TAKE 1 TABLET ONCE WEEKLY IN THE MORNING WITH A FULL GLASS OF WATER ON AN EMPTY STOMACH. DO NOT LIE DOWN FOR AT LEAST 30 MINUTES AFTERWARDS., Disp: 12 tablet, Rfl: 1    aspirin 81 MG Chew, Take 1 tablet (81 mg total) by mouth once daily., Disp: , Rfl: 0    cholecalciferol, vitamin D3, (VITAMIN D3) 1,000 unit Chew, Take 2 tabs daily, Disp: , Rfl:     levetiracetam XR (KEPPRA XR) 500 mg Tb24 24 hr tablet, TK 1 T PO AT BEDTIME, Disp: , Rfl: 2    MULTIVIT,MIN52-FOLIC-VITK-CQ10 ORAL, Take by mouth., Disp: , Rfl:     amLODIPine (NORVASC) 5 MG tablet, Take 1 tablet (5 mg total) by mouth once daily., Disp: 30 tablet, Rfl: 0    atorvastatin (LIPITOR) 20 MG tablet, Take 1 tablet (20 mg total) by mouth once daily., Disp: 30 tablet, Rfl: 0    fluocinonide 0.05% (LIDEX) 0.05 % cream, Apply topically 2 (two) times daily., Disp: 120 g, Rfl: 1    fluocinonide-emollient (FLUOCINONIDE-E) 0.05 % Crea, Apply topically., Disp: , Rfl:     mupirocin (BACTROBAN) 2 % ointment, Apply topically 3 (three) times daily., Disp: 1 Tube, Rfl: 0    omeprazole (PRILOSEC) 20 MG capsule, Take 1 capsule (20 mg total) by mouth once daily., Disp: 90 capsule, Rfl: 1    triamcinolone acetonide 0.1% (KENALOG) 0.1 % cream, Apply topically 2 (two) times  daily., Disp: , Rfl:        Objective:         Vitals:    09/05/19 1201   BP: 123/82   Pulse: 89       Physical Exam   Nursing note and vitals reviewed.  Constitutional: She is oriented to person, place, and time and well-developed, well-nourished, and in no distress. No distress.   HENT:   Head: Normocephalic and atraumatic.   Right Ear: External ear normal.   Left Ear: External ear normal.   Eyes: Conjunctivae and EOM are normal. Pupils are equal, round, and reactive to light.   Neck: Normal range of motion. Neck supple.   Cardiovascular: Normal rate, regular rhythm and normal heart sounds.    Pulmonary/Chest: Effort normal and breath sounds normal. No respiratory distress. She has no wheezes.   Abdominal: Soft. Bowel sounds are normal.   Neurological: She is alert and oriented to person, place, and time.   Skin: Skin is warm and dry. No rash noted.     Psychiatric: Affect and judgment normal.   Musculoskeletal: Normal range of motion. No synovitis in small joints of hands and feet        Reviewed old and all outside pertinent medical records available.    All lab results personally reviewed and interpreted by me.  Lab Results   Component Value Date    WBC 5.06 01/16/2019    HGB 13.6 01/16/2019    HCT 40.6 01/16/2019    MCV 89 01/16/2019    MCH 30.0 01/16/2019    MCHC 33.5 01/16/2019    RDW 14.1 01/16/2019     01/16/2019    MPV 10.2 01/16/2019       Lab Results   Component Value Date     01/16/2019    K 4.0 01/16/2019     01/16/2019    CO2 21 (L) 01/16/2019     01/16/2019    BUN 12 01/16/2019    CALCIUM 9.4 01/16/2019    PROT 7.7 04/23/2019    ALBUMIN 4.2 04/23/2019    BILITOT 0.4 04/23/2019    AST 24 04/23/2019    ALKPHOS 75 04/23/2019    ALT 33 04/23/2019       Lab Results   Component Value Date    COLORU Yellow 01/24/2019    APPEARANCEUA Clear 01/24/2019    SPECGRAV 1.015 01/24/2019    PHUR 6.0 01/24/2019    PROTEINUA Negative 01/24/2019    KETONESU Negative 01/24/2019    LEUKOCYTESUR  Negative 01/24/2019    NITRITE Negative 01/24/2019    UROBILINOGEN Negative 09/06/2016       Lab Results   Component Value Date    CRP 1.9 01/16/2019       Lab Results   Component Value Date    SEDRATE 21 01/16/2019       Lab Results   Component Value Date    RF <10.0 01/24/2019    SEDRATE 21 01/16/2019       No components found for: 25OHVITDTOT, 04QHSCHY4, 00TCARDF7, METHODNOTE    Lab Results   Component Value Date    URICACID 3.1 08/17/2015       No components found for: TSPOTTB    LILIANE 1:640 centromere    Imaging:  All imaging reviewed and independently  interpreted by me.    MRI Brain 1/16/19  COMPARISON:  CT brain 01/16/2019.    FINDINGS:  The ventricles are mildly enlarged as are the extra-axial CSF spaces.    Several small scattered white matter hyperintense T2 signal foci are present consistent with age-related small vessel ischemic degeneration.    The diffusion-weighted sequence is negative.  No evidence of acute infarction.    No acute hemorrhage or extra-axial fluid collection.    Pituitary gland region appears normal.    The diffusion sequence is negative.      Impression       No acute findings.  Mild cerebral and cerebellar volume loss with associated white matter degeneration.  Otherwise negative.     Hand XR 2015    Findings: Three views both hands.  There is generalized osteopenia and multi-articular degenerative change variably involving the CMC, MCP, and IP joints.  No definite periarticular erosions identified.  No acute fracture or dislocation.      Impression      As above.          ASSESSMENT / PLAN:     Rissa Garcia is a 69 y.o. White female with:      #Antiphospholipid syndrome  -LILIANE 1:640 centromere; TOBI negative, [place]+, cardiolipin+, hx TIA  -Recommend initiation of anticoagulation with coumadin. Discussed at length w pt risks/benefits of medications. Pt wishes to defer at this time. Will continue to monitor and re-address anti-coagulation at rtc       Time spent discussing medical condition  w pt >40min.    Method of contact with patient concerns: Jhonathanhart attn Rheumatology      Jennifer Snyder M.D.  Rheumatology Department   [facility] [place] [address], [place] [address]  Phone: [phone]  Fax: [phone]

## 2019-09-11 ENCOUNTER — TELEPHONE (OUTPATIENT)
Dept: INTERNAL MEDICINE | Facility: CLINIC | Age: 69
End: 2019-09-11

## 2019-09-11 NOTE — TELEPHONE ENCOUNTER
----- Message from Mariel Mills sent at 9/11/2019  3:16 PM CDT -----  .Type:  Mammogram    Caller is requesting to schedule their annual mammogram appointment.  Order is not listed in EPIC.  Please enter order and contact patient to schedule.  Name of Caller:self  [facility]  Would the patient rather a call back or a response via [facility]? call  Best Call Back Number:.[phone] (home)   Additional Information:

## 2019-09-11 NOTE — TELEPHONE ENCOUNTER
Pt call returned. Pt declines to see Dr. Steel for annual visit. Pt stated she sees another doctor and will get them to place

## 2019-09-30 ENCOUNTER — TELEPHONE (OUTPATIENT)
Dept: RHEUMATOLOGY | Facility: CLINIC | Age: 69
End: 2019-09-30

## 2019-09-30 NOTE — TELEPHONE ENCOUNTER
----- Message from Jossie Rosario sent at 9/30/2019  4:20 PM CDT -----  Contact: pt  The pt request a return call concerning a gene test, no additional info given and can be reached at [phone]///thxMW

## 2019-10-03 ENCOUNTER — HOSPITAL ENCOUNTER (OUTPATIENT)
Dept: RADIOLOGY | Facility: HOSPITAL | Age: 69
Discharge: HOME | End: 2019-10-03
Attending: FAMILY MEDICINE
Payer: MEDICARE

## 2019-10-03 ENCOUNTER — TELEPHONE (OUTPATIENT)
Dept: INTERNAL MEDICINE | Facility: CLINIC | Age: 69
End: 2019-10-03

## 2019-10-03 VITALS — BODY MASS INDEX: 23.85 KG/M2 | WEIGHT: 121.5 LBS | HEIGHT: 60 IN

## 2019-10-03 DIAGNOSIS — Z12.39 BREAST CANCER SCREENING: Primary | ICD-10-CM

## 2019-10-03 DIAGNOSIS — Z12.39 BREAST CANCER SCREENING: ICD-10-CM

## 2019-10-03 PROCEDURE — 77063 MAMMO DIGITAL SCREENING BILAT WITH TOMOSYNTHESIS_CAD: ICD-10-PCS | Mod: 26,,, | Performed by: RADIOLOGY

## 2019-10-03 PROCEDURE — 77067 MAMMO DIGITAL SCREENING BILAT WITH TOMOSYNTHESIS_CAD: ICD-10-PCS | Mod: 26,,, | Performed by: RADIOLOGY

## 2019-10-03 PROCEDURE — 77067 SCR MAMMO BI INCL CAD: CPT | Mod: TC,PO

## 2019-10-03 PROCEDURE — 77067 SCR MAMMO BI INCL CAD: CPT | Mod: 26,,, | Performed by: RADIOLOGY

## 2019-10-03 PROCEDURE — 77063 BREAST TOMOSYNTHESIS BI: CPT | Mod: 26,,, | Performed by: RADIOLOGY

## 2019-10-03 NOTE — TELEPHONE ENCOUNTER
----- Message from Valentine Morrell sent at 10/3/2019 12:22 PM CDT -----  Contact: Pt   Pt is calling .Type:  Mammogram    Pt  is requesting to schedule their annual mammogram appointment.  Order is not listed in EPIC.  Please enter order and contact patient to schedule.  Name of Caller: Pt   Where would they like the mammogram performed?  [address] [address]  Would the patient rather a call back or a response via [facility]? Call Back   Best Call Back Number: [phone]           .Thank You  Valentine Morrell

## 2019-10-08 ENCOUNTER — PATIENT MESSAGE (OUTPATIENT)
Dept: INTERNAL MEDICINE | Facility: CLINIC | Age: 69
End: 2019-10-08

## 2019-10-31 ENCOUNTER — PES CALL (OUTPATIENT)
Dept: ADMINISTRATIVE | Facility: CLINIC | Age: 69
End: 2019-10-31

## 2019-11-20 ENCOUNTER — PATIENT MESSAGE (OUTPATIENT)
Dept: RHEUMATOLOGY | Facility: CLINIC | Age: 69
End: 2019-11-20

## 2019-12-08 DIAGNOSIS — M81.0 OSTEOPOROSIS: ICD-10-CM

## 2019-12-09 RX ORDER — ALENDRONATE SODIUM 70 MG/1
TABLET ORAL
Qty: 12 TABLET | Refills: 4 | Status: SHIPPED | OUTPATIENT
Start: 2019-12-09 | End: 2021-02-01

## 2019-12-15 ENCOUNTER — PATIENT MESSAGE (OUTPATIENT)
Dept: INTERNAL MEDICINE | Facility: CLINIC | Age: 69
End: 2019-12-15

## 2019-12-18 ENCOUNTER — PATIENT MESSAGE (OUTPATIENT)
Dept: INTERNAL MEDICINE | Facility: CLINIC | Age: 69
End: 2019-12-18

## 2019-12-18 NOTE — TELEPHONE ENCOUNTER
I suggest that we wait until the day of her next appointment as I want to be sure that no new concerns come up which would prompt repeat venopuncture.

## 2019-12-18 NOTE — TELEPHONE ENCOUNTER
Patient would like to have labs done before appointment so that the result can be discuss at her appointment.    Please advise

## 2019-12-18 NOTE — TELEPHONE ENCOUNTER
Patient is requesting to have lab orders placed, so she can schedule. LOV: 01/16/19.    Please Advise

## 2019-12-18 NOTE — TELEPHONE ENCOUNTER
----- Message from Gina Sourav sent at 12/18/2019  2:33 PM CST -----  Contact: Self  Type:  Patient Returning Call    Who Called:June  Who Left Message for Patient:  Does the patient know what this is regarding?:  Would the patient rather a call back or a response via [facility]? call  Best Call Back Number:[phone]  Additional Information: Pt has made annual appt for 01/02/19. She is requesting to have all lab work done on that day and would like to know if the results will get to Dr. Steel in time for the appt. She will also need a copy of the [facility] to [facility] at [facility] end of [facility].

## 2019-12-20 DIAGNOSIS — R73.03 PREDIABETES: Primary | ICD-10-CM

## 2019-12-20 DIAGNOSIS — M81.0 OSTEOPOROSIS, UNSPECIFIED OSTEOPOROSIS TYPE, UNSPECIFIED PATHOLOGICAL FRACTURE PRESENCE: ICD-10-CM

## 2019-12-20 DIAGNOSIS — E78.5 HYPERLIPIDEMIA, UNSPECIFIED HYPERLIPIDEMIA TYPE: ICD-10-CM

## 2019-12-27 ENCOUNTER — PATIENT MESSAGE (OUTPATIENT)
Dept: INTERNAL MEDICINE | Facility: CLINIC | Age: 69
End: 2019-12-27

## 2019-12-27 DIAGNOSIS — Z01.00 EYE EXAM, ROUTINE: Primary | ICD-10-CM

## 2019-12-27 NOTE — TELEPHONE ENCOUNTER
Patient stated that she was last seen by Dr. Abbott in 2017. Patient would like a referral please sign

## 2020-01-03 ENCOUNTER — OFFICE VISIT (OUTPATIENT)
Dept: OPHTHALMOLOGY | Facility: CLINIC | Age: 70
End: 2020-01-03
Payer: MEDICARE

## 2020-01-03 DIAGNOSIS — H53.2 TRANSIENT DIPLOPIA: ICD-10-CM

## 2020-01-03 DIAGNOSIS — H25.13 NUCLEAR SCLEROSIS, BILATERAL: Primary | ICD-10-CM

## 2020-01-03 PROCEDURE — 99999 PR PBB SHADOW E&M-EST. PATIENT-LVL I: ICD-10-PCS | Mod: PBBFAC,,, | Performed by: OPTOMETRIST

## 2020-01-03 PROCEDURE — 99999 PR PBB SHADOW E&M-EST. PATIENT-LVL I: CPT | Mod: PBBFAC,,, | Performed by: OPTOMETRIST

## 2020-01-03 PROCEDURE — 99211 OFF/OP EST MAY X REQ PHY/QHP: CPT | Mod: PBBFAC | Performed by: OPTOMETRIST

## 2020-01-03 PROCEDURE — 92004 COMPRE OPH EXAM NEW PT 1/>: CPT | Mod: S$PBB,,, | Performed by: OPTOMETRIST

## 2020-01-03 PROCEDURE — 92004 PR EYE EXAM, NEW PATIENT,COMPREHESV: ICD-10-PCS | Mod: S$PBB,,, | Performed by: OPTOMETRIST

## 2020-01-03 NOTE — PROGRESS NOTES
HPI     Pt's last eye exam was 2016.     Any vision changes since last exam: None   Eye pain: None  Other ocular symptoms: Pt states that around the 22nd or 23rd, she started   experience some double vision. She said that if she closed either eye, it   was fine. She started patching one eye and alternating whenever she felt   she should. She says that on the 1st of the year, the problem resolved   almost completely. She says that there is still distortion with her   vision, and that when she turns her head, the distortion goes away.     Do you wear currently wear glasses or contacts? Glasses    Interested in contacts today? No    Do you plan on getting new glasses today? If needed        Last edited by Nabil Powers, Patient Care Assistant on 1/3/2020  1:01   PM. (History)            Assessment /Plan     For exam results, see Encounter Report.    Nuclear sclerosis, bilateral  Surgery is not indicated at this time.   Monitor 12 months.    Transient diplopia  EOMs appear intact today  Perhaps resolving microvascular nerve palsy  She reports distorted images periodically but no more diplopia  Improves with head tilt    RTC for undilated EOM check, archana's 3 step and dry refraction or PRN

## 2020-01-03 NOTE — LETTER
January 3, 2020      Jerome Steel DO  [address] [address] [address]           [facility]  [address] [facility] [address]  Phone: [phone]  Fax: [phone]          Patient: Rissa Garcia   MR Number: [id number]   YOB: 1950   Date of Visit: 1/3/2020       Dear Dr. Jerome Steel:    Thank you for referring Rissa Garcia to me for evaluation. Attached you will find relevant portions of my assessment and plan of care.    If you have questions, please do not hesitate to call me. I look forward to following Rissa Garcia along with you.    Sincerely,    Lenny Abbott, OD    Enclosure  CC:  No Recipients    If you would like to receive this communication electronically, please contact [email or web address] or [phone] to request more information on [employer] Link access.    For providers and/or their staff who would like to refer a patient to [facility], please contact us through our one-stop-shop provider referral line, [facility], at [phone].    If you feel you have received this communication in error or would no longer like to receive these types of communications, please e-mail [email or web address]

## 2020-01-13 ENCOUNTER — LAB VISIT (OUTPATIENT)
Dept: LAB | Facility: HOSPITAL | Age: 70
End: 2020-01-13
Attending: NURSE PRACTITIONER
Payer: MEDICARE

## 2020-01-13 DIAGNOSIS — E78.5 HYPERLIPIDEMIA, UNSPECIFIED HYPERLIPIDEMIA TYPE: ICD-10-CM

## 2020-01-13 DIAGNOSIS — M81.0 OSTEOPOROSIS, UNSPECIFIED OSTEOPOROSIS TYPE, UNSPECIFIED PATHOLOGICAL FRACTURE PRESENCE: ICD-10-CM

## 2020-01-13 DIAGNOSIS — R73.03 PREDIABETES: ICD-10-CM

## 2020-01-13 LAB
ALBUMIN SERPL BCP-MCNC: 4.1 G/DL (ref 3.5–5.2)
ALP SERPL-CCNC: 71 U/L (ref 55–135)
ALT SERPL W/O P-5'-P-CCNC: 15 U/L (ref 10–44)
ANION GAP SERPL CALC-SCNC: 10 MMOL/L (ref 8–16)
AST SERPL-CCNC: 16 U/L (ref 10–40)
BASOPHILS # BLD AUTO: 0.04 K/UL (ref 0–0.2)
BASOPHILS NFR BLD: 1 % (ref 0–1.9)
BILIRUB SERPL-MCNC: 0.7 MG/DL (ref 0.1–1)
BUN SERPL-MCNC: 12 MG/DL (ref 8–23)
CALCIUM SERPL-MCNC: 9.8 MG/DL (ref 8.7–10.5)
CHLORIDE SERPL-SCNC: 103 MMOL/L (ref 95–110)
CO2 SERPL-SCNC: 28 MMOL/L (ref 23–29)
CREAT SERPL-MCNC: 1 MG/DL (ref 0.5–1.4)
DIFFERENTIAL METHOD: NORMAL
EOSINOPHIL # BLD AUTO: 0 K/UL (ref 0–0.5)
EOSINOPHIL NFR BLD: 0.3 % (ref 0–8)
ERYTHROCYTE [DISTWIDTH] IN BLOOD BY AUTOMATED COUNT: 13.3 % (ref 11.5–14.5)
EST. GFR  (AFRICAN AMERICAN): >60 ML/MIN/1.73 M^2
EST. GFR  (NON AFRICAN AMERICAN): 57.2 ML/MIN/1.73 M^2
ESTIMATED AVG GLUCOSE: 126 MG/DL (ref 68–131)
GLUCOSE SERPL-MCNC: 107 MG/DL (ref 70–110)
HBA1C MFR BLD HPLC: 6 % (ref 4–5.6)
HCT VFR BLD AUTO: 41.6 % (ref 37–48.5)
HGB BLD-MCNC: 13.4 G/DL (ref 12–16)
IMM GRANULOCYTES # BLD AUTO: 0.01 K/UL (ref 0–0.04)
IMM GRANULOCYTES NFR BLD AUTO: 0.3 % (ref 0–0.5)
LYMPHOCYTES # BLD AUTO: 1.6 K/UL (ref 1–4.8)
LYMPHOCYTES NFR BLD: 41.3 % (ref 18–48)
MCH RBC QN AUTO: 29.3 PG (ref 27–31)
MCHC RBC AUTO-ENTMCNC: 32.2 G/DL (ref 32–36)
MCV RBC AUTO: 91 FL (ref 82–98)
MONOCYTES # BLD AUTO: 0.3 K/UL (ref 0.3–1)
MONOCYTES NFR BLD: 7.3 % (ref 4–15)
NEUTROPHILS # BLD AUTO: 2 K/UL (ref 1.8–7.7)
NEUTROPHILS NFR BLD: 49.8 % (ref 38–73)
NRBC BLD-RTO: 0 /100 WBC
PLATELET # BLD AUTO: 221 K/UL (ref 150–350)
PMV BLD AUTO: 10.2 FL (ref 9.2–12.9)
POTASSIUM SERPL-SCNC: 4.1 MMOL/L (ref 3.5–5.1)
PROT SERPL-MCNC: 7.6 G/DL (ref 6–8.4)
RBC # BLD AUTO: 4.58 M/UL (ref 4–5.4)
SODIUM SERPL-SCNC: 141 MMOL/L (ref 136–145)
WBC # BLD AUTO: 3.95 K/UL (ref 3.9–12.7)

## 2020-01-13 PROCEDURE — 82306 VITAMIN D 25 HYDROXY: CPT

## 2020-01-13 PROCEDURE — 85025 COMPLETE CBC W/AUTO DIFF WBC: CPT | Mod: PO

## 2020-01-13 PROCEDURE — 80061 LIPID PANEL: CPT

## 2020-01-13 PROCEDURE — 80053 COMPREHEN METABOLIC PANEL: CPT | Mod: PO

## 2020-01-13 PROCEDURE — 36415 COLL VENOUS BLD VENIPUNCTURE: CPT | Mod: PO

## 2020-01-13 PROCEDURE — 83036 HEMOGLOBIN GLYCOSYLATED A1C: CPT

## 2020-01-14 LAB
25(OH)D3+25(OH)D2 SERPL-MCNC: 38 NG/ML (ref 30–96)
CHOLEST SERPL-MCNC: 262 MG/DL (ref 120–199)
CHOLEST/HDLC SERPL: 4.1 {RATIO} (ref 2–5)
HDLC SERPL-MCNC: 64 MG/DL (ref 40–75)
HDLC SERPL: 24.4 % (ref 20–50)
LDLC SERPL CALC-MCNC: 170.4 MG/DL (ref 63–159)
NONHDLC SERPL-MCNC: 198 MG/DL
TRIGL SERPL-MCNC: 138 MG/DL (ref 30–150)

## 2020-01-21 ENCOUNTER — TELEPHONE (OUTPATIENT)
Dept: INTERNAL MEDICINE | Facility: CLINIC | Age: 70
End: 2020-01-21

## 2020-01-21 NOTE — TELEPHONE ENCOUNTER
Patient called in regarding getting clarification on her blood work. Patient was informed of her results and verbally understood the information given. Patient asked that her results be printed for her and she would stop by at a later date to pick it up. Patient results were printed and placed at the  for her to . Pt verbally understood.

## 2020-01-21 NOTE — TELEPHONE ENCOUNTER
----- Message from Obed Mandujano sent at 1/21/2020 11:04 AM CST -----  Contact: Rissa Garcia Pt would like a call back. She would not state reason.

## 2020-01-22 ENCOUNTER — TELEPHONE (OUTPATIENT)
Dept: INTERNAL MEDICINE | Facility: CLINIC | Age: 70
End: 2020-01-22

## 2020-01-22 NOTE — TELEPHONE ENCOUNTER
----- Message from Angie Nunes sent at 1/22/2020  8:51 AM CST -----  Type:  Needs Medical Advice    Who Called:  Pt  June  Symptoms (please be specific):   Regarding blood work   How long has patient had these symptoms:     Pharmacy name and phone #:     Would the patient rather a call back or a response via [facility]?   Call back  Best Call Back Number:   [phone]  Additional Information:  Pt states she will be going by your office in a little while and is wanting to  a copy of her lab results//also Cecelia was going to live a copy of tests at the first desk//she will need a copy of all tests//please call//thanks//opal

## 2020-01-31 ENCOUNTER — OFFICE VISIT (OUTPATIENT)
Dept: OPHTHALMOLOGY | Facility: CLINIC | Age: 70
End: 2020-01-31
Payer: MEDICARE

## 2020-01-31 DIAGNOSIS — H53.2 TRANSIENT DIPLOPIA: Primary | ICD-10-CM

## 2020-01-31 PROCEDURE — 99211 OFF/OP EST MAY X REQ PHY/QHP: CPT | Mod: PBBFAC | Performed by: OPTOMETRIST

## 2020-01-31 PROCEDURE — 99499 UNLISTED E&M SERVICE: CPT | Mod: S$PBB,,, | Performed by: OPTOMETRIST

## 2020-01-31 PROCEDURE — 99499 NO LOS: ICD-10-PCS | Mod: S$PBB,,, | Performed by: OPTOMETRIST

## 2020-01-31 PROCEDURE — 99999 PR PBB SHADOW E&M-EST. PATIENT-LVL I: CPT | Mod: PBBFAC,,, | Performed by: OPTOMETRIST

## 2020-01-31 PROCEDURE — 99999 PR PBB SHADOW E&M-EST. PATIENT-LVL I: ICD-10-PCS | Mod: PBBFAC,,, | Performed by: OPTOMETRIST

## 2020-01-31 NOTE — PROGRESS NOTES
HPI     Patient last visit with DNL on 01/03/2020 for eye exam.  Was told to rtc for undilated EOM check, archana's 3 step and dry refraction.      Last edited by Richa Harris on 1/31/2020 10:16 AM. (History)            Assessment /Plan     For exam results, see Encounter Report.    Transient diplopia      Symptoms have resolved completely per pt  She denies any distortion and denies diplopia  EOMs are intact OU, no deviations   Pupils normal with no apd    Eyeglass Final Rx     Eyeglass Final Rx       Sphere Cylinder Axis Add    Right -3.25 +0.75 095 +2.50    Left -3.25 +0.75 065 +2.50    Expiration Date:  1/31/2021              RTC 1 yr for dilated exam or PRN with any changes or concerns  Discussed above and all questions were answered.

## 2020-08-07 ENCOUNTER — TELEPHONE (OUTPATIENT)
Dept: INTERNAL MEDICINE | Facility: CLINIC | Age: 70
End: 2020-08-07

## 2020-08-07 ENCOUNTER — PATIENT OUTREACH (OUTPATIENT)
Dept: ADMINISTRATIVE | Facility: HOSPITAL | Age: 70
End: 2020-08-07

## 2020-08-07 NOTE — LETTER
August 7, 2020        Darren Lazo MD  [address]  [address]  [facility] [address]             [facility]  [address]  [facility] [address]  Phone: [phone]   Patient: Rissa Garcia   MR Number: [id number]   YOB: 1950   Date of Visit: 8/7/2020       Dear Dr. Lazo:         We are seeing Rissa GarciaTUNG.B is 1950, at [facility]. Jerome Steel DO is their primary care physician. To help with our health maintenance records could you please send the following:     Most recent Colonoscopy with Path Report  Please send fax to [phone].    Thank-you in advance for your assistance. If you have any questions or concerns, please don't hesitate to contact me at [phone].     Sincerely,  Rubi SHARMA LPN Care Coordination   [facility]   Phone [phone] ext [phone],  Fax [phone]  [phone]

## 2020-08-07 NOTE — TELEPHONE ENCOUNTER
----- Message from Ayaka Montejo LPN sent at 8/7/2020 10:01 AM CDT -----  Ms Garcia is overdue for her annual exam. I tried to schedule but she can only come in next week and would not do anything in the future. Can someone please reach out to her and possibly get her appt.     Thanks. Rubi

## 2020-08-07 NOTE — PROGRESS NOTES
"MSSP Report-Patients not seen in a year.    Care Everywhere-no new records abstracted.  Lab Stefan-No records found.    Discussed due for annual exam and offered to schedule appt. She was willing if it could be "scheduled next week", anything after that she did not know when she would be able to come in. I was unable to find appt, but did advise her message would be sent to Dr Steel office to see if they could see her. She then said expressed how it's always this way can never get in.  Faxed request for most recent colon report.  "

## 2020-08-11 NOTE — PROGRESS NOTES
Received most recent colonoscopy and pathology report scanned into chart today and sent to LPN-CC.

## 2020-08-25 ENCOUNTER — OFFICE VISIT (OUTPATIENT)
Dept: INTERNAL MEDICINE | Facility: CLINIC | Age: 70
End: 2020-08-25
Payer: MEDICARE

## 2020-08-25 VITALS
HEART RATE: 64 BPM | TEMPERATURE: 98 F | DIASTOLIC BLOOD PRESSURE: 68 MMHG | SYSTOLIC BLOOD PRESSURE: 138 MMHG | WEIGHT: 123.25 LBS | BODY MASS INDEX: 24.2 KG/M2 | HEIGHT: 60 IN | OXYGEN SATURATION: 99 %

## 2020-08-25 DIAGNOSIS — I77.1 TORTUOUS ARTERY: ICD-10-CM

## 2020-08-25 DIAGNOSIS — S29.012A MUSCLE STRAIN OF LEFT UPPER BACK, INITIAL ENCOUNTER: Primary | ICD-10-CM

## 2020-08-25 DIAGNOSIS — G40.309 GENERALIZED IDIOPATHIC EPILEPSY AND EPILEPTIC SYNDROMES, NOT INTRACTABLE, WITHOUT STATUS EPILEPTICUS: ICD-10-CM

## 2020-08-25 PROBLEM — R19.5 POSITIVE COLORECTAL CANCER SCREENING USING COLOGUARD TEST: Status: ACTIVE | Noted: 2019-06-18

## 2020-08-25 PROCEDURE — 99214 OFFICE O/P EST MOD 30 MIN: CPT | Mod: PBBFAC,PO | Performed by: NURSE PRACTITIONER

## 2020-08-25 PROCEDURE — 99214 PR OFFICE/OUTPT VISIT, EST, LEVL IV, 30-39 MIN: ICD-10-PCS | Mod: S$PBB,,, | Performed by: NURSE PRACTITIONER

## 2020-08-25 PROCEDURE — 99999 PR PBB SHADOW E&M-EST. PATIENT-LVL IV: ICD-10-PCS | Mod: PBBFAC,,, | Performed by: NURSE PRACTITIONER

## 2020-08-25 PROCEDURE — 99999 PR PBB SHADOW E&M-EST. PATIENT-LVL IV: CPT | Mod: PBBFAC,,, | Performed by: NURSE PRACTITIONER

## 2020-08-25 PROCEDURE — 99214 OFFICE O/P EST MOD 30 MIN: CPT | Mod: S$PBB,,, | Performed by: NURSE PRACTITIONER

## 2020-08-25 RX ORDER — MELOXICAM 7.5 MG/1
7.5 TABLET ORAL DAILY
Qty: 30 TABLET | Refills: 0 | Status: SHIPPED | OUTPATIENT
Start: 2020-08-25 | End: 2020-09-08 | Stop reason: SDUPTHER

## 2020-08-25 RX ORDER — TIZANIDINE 4 MG/1
4 TABLET ORAL EVERY 6 HOURS PRN
Qty: 30 TABLET | Refills: 0 | Status: SHIPPED | OUTPATIENT
Start: 2020-08-25 | End: 2020-09-11

## 2020-08-25 NOTE — PATIENT INSTRUCTIONS
Back Sprain or Strain    Injury to the muscles (strain) or ligaments (sprain) around the spine can be troubling. Injury may occur after a sudden forceful twisting or bending force such as in a car accident, after a simple awkward movement, or after lifting something heavy with poor body positioning. In any case, muscle spasm is often present and adds to the pain.  Thankfully, most people feel better in 1 to 2 weeks, and most of the rest in 1 to 2 months. Most people can remain active. Unless you had a forceful or traumatic physical injury such as a car accident or fall, X-rays may not be ordered for the first evaluation of a back sprain or strain. If pain continues and does not respond to medical treatment, your healthcare provider may then order X-rays and other tests.  Home care  The following guidelines will help you care for your injury at home:  · When in bed, try to find a comfortable position. A firm mattress is best. Try lying flat on your back with pillows under your knees. You can also try lying on your side with your knees bent up toward your chest and a pillow between your knees.  · Don't sit for long periods. Try not to take long car rides or take other trips that have you sitting for a long time. This puts more stress on the lower back than standing or walking.  · During the first 24 to 72 hours after an injury or flare-up, apply an ice pack to the painful area for 20 minutes. Then remove it for 20 minutes. Do this for 60 to 90 minutes, or several times a day. This will reduce swelling and pain. Be sure to wrap the ice pack in a thin towel or plastic to protect your skin.  · You can start with ice, then switch to heat. Heat from a hot shower, hot bath, or heating pad reduces pain and works well for muscle spasms. Put heat on the painful area for 20 minutes, then remove for 20 minutes. Do this for 60 to 90 minutes, or several times a day. Do not use a heating pad while sleeping. It can burn the  skin.  · You can alternate the ice and heat. Talk with your healthcare provider to find out the best treatment or therapy for your back pain.  · Therapeutic massage will help relax the back muscles without stretching them.  · Be aware of safe lifting methods. Do not lift anything over 15 pounds until all of the pain is gone.  Medicines  Talk to your healthcare provider before using medicines, especially if you have other health problems or are taking other medicines.  · You may use acetaminophen or ibuprofen to control pain, unless another pain medicine was prescribed. If you have chronic conditions like diabetes, liver or kidney disease, stomach ulcers, or gastrointestinal bleeding, or are taking blood-thinner medicines, talk with your doctor before taking any medicines.  · Be careful if you are given prescription medicines, narcotics, or medicine for muscle spasm. They can cause drowsiness, and affect your coordination, reflexes, and judgment. Do not drive or operate heavy machinery when taking these types of medicines. Only take pain medicine as prescribed by your healthcare provider.  Follow-up care  Follow up with your healthcare provider, or as advised. You may need physical therapy or more tests if your symptoms get worse.  If you had X-rays your healthcare provider may be checking for any broken bones, breaks, or fractures. Bruises and sprains can sometimes hurt as much as a fracture. These injuries can take time to heal completely. If your symptoms dont improve or they get worse, talk with your healthcare provider. You may need a repeat X-ray or other tests.  Call 911  Call for emergency care if any of the following occur:  · Trouble breathing  · Confused  · Very drowsy or trouble awakening  · Fainting or loss of consciousness  · Rapid or very slow heart rate  · Loss of bowel or bladder control  When to seek medical advice  Call your healthcare provider right away if any of the following occur:  · Pain  gets worse or spreads to your arms or legs  · Weakness or numbness in one or both arms or legs  · Numbness in the groin or genital area  Date Last Reviewed: 6/1/2016 © 2000-2017 [employer]. [address] [place] [address], [place] [address]. All rights reserved. This information is not intended as a substitute for professional medical care. Always follow your healthcare professional's instructions.        Back Spasm (No Trauma)    Spasm of the back muscles can occur after a sudden forceful twisting or bending force (such as in a car accident), after a simple awkward movement, or after lifting something heavy with poor body positioning. In any case, muscle spasm adds to the pain. Sleeping in an awkward position or on a poor quality mattress can also cause this. Some people respond to emotional stress by tensing the muscles of their back.  Pain that continues may need further evaluation or other types of treatment such as physical therapy.  You don't always need X-rays for the initial evaluation of back pain, unless you had a physical injury such as from a car accident or fall. If your pain continues and doesn't respond to medical treatment, X-rays and other tests may then be done.   Home care  · As soon as possible, start sitting or walking again to avoid problems from prolonged bed rest (muscle weakness, worsening back stiffness and pain, blood clots in the legs).  · When in bed, try to find a position of comfort. A firm mattress is best. Try lying flat on your back with pillows under your knees. You can also try lying on your side with your knees bent up toward your chest and a pillow between your knees.  · Avoid prolonged sitting, long car rides, or travel. This puts more stress on the lower back than standing or walking.   · During the first 24 to 72 hours after an injury or flare-up, apply an ice pack to the painful area for 20 minutes, then remove it for 20 minutes. Do this over a period of 60 to 90  minutes or several times a day. This will reduce swelling and pain. Always wrap ice packs in a thin towel.  · You can start with ice, then switch to heat. Heat (hot shower, hot bath, or heating pad) reduces pain, and works well for muscle spasms. Apply heat to the painful area for 20 minutes, then remove it for 20 minutes. Do this over a period of 60 to 90 minutes or several times a day. Do not sleep on a heating pad as it can burn or damage skin.  · Alternate ice and heat therapies.  · Be aware of safe lifting methods and do not lift anything over 15 pounds until all the pain is gone.  Gentle stretching will help your back heal faster. Do this simple routine 2 to 3 times a day until your back is feeling better.  · Lie on your back with your knees bent and both feet on the ground  · Slowly raise your left knee to your chest as you flatten your lower back against the floor. Hold for 20 to 30 seconds.  · Relax and repeat the exercise with your right knee.  · Do 2 to 3 of these exercises for each leg.  · Repeat, hugging both knees to your chest at the same time.  · Do not bounce, but use a gentle pull.  Medicines  Talk to your doctor before using medicine, especially if you have other medical problems or are taking other medicines.  You may use acetaminophen or ibuprofen to control pain, unless your healthcare provider prescribed another pain medicine. If you have a chronic condition such as diabetes, liver or kidney disease, stomach ulcer, or gastrointestinal bleeding, or are taking blood thinners, talk with your healthcare provider before taking any medicines.  Be careful if you are given prescription pain medicine, narcotics, or medicine for muscle spasm. They can cause drowsiness, affect your coordination, reflexes, or judgment. Do not drive or operate heavy machinery when taking these medicines. Take pain medicine only as prescribed by your healthcare provider.  Follow-up care  Follow up with your doctor, or as  advised. Physical therapy or further tests may be needed.  If X-rays were taken, they may be reviewed by a radiologist. You will be notified of any new findings that may affect your care.  Call 911  Seek emergency medical care if any of these occur:  · Trouble breathing  · Confusion  · Drowsiness or trouble awakening  · Fainting or loss of consciousness  · Rapid or very slow heart rate  · Loss of bowel or bladder control  When to seek medical advice  Call your healthcare provider right away if any of these occur:  · Pain becomes worse or spreads to your legs  · Weakness or numbness in one or both legs  · Numbness in the groin or genital area  · Unexplained fever over 100.4ºF (38.0ºC)  · Burning or pain when passing urine  Date Last Reviewed: 6/1/2016  © 4982-1165 [employer]. [address] [place] [address], [place] [address]. All rights reserved. This information is not intended as a substitute for professional medical care. Always follow your healthcare professional's instructions.

## 2020-08-25 NOTE — PROGRESS NOTES
Subjective:       Patient ID: Rissa Garcia is a 70 y.o. female.    Chief Complaint: Neck Pain (stress) and Shoulder Pain    Mrs. Garcia presents to clinic with complaints of Left sided mid-upper back pain that is worse with certain positions. Pain intermittently radiates down L arm. Denies any known injury. Denies cough, SOB, CP, abdominal pain, N/V/D, or changes in B/B. 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 Does not want to get on daily regimen for anxiety management at this time.     Back Pain  This is a new problem. The current episode started in the past 7 days (2 days). The problem occurs intermittently. The problem has been waxing and waning since onset. Pain location: L side upper/mid back. The quality of the pain is described as aching and shooting. Radiates to: L arm. The pain is at a severity of 10/10. The pain is moderate. The pain is the same all the time. The symptoms are aggravated by position, standing and twisting. Pertinent negatives include no abdominal pain, bladder incontinence, bowel incontinence, chest pain, dysuria, fever, headaches, leg pain, numbness, tingling or weakness. She has tried NSAIDs, muscle relaxant, heat and ice (lying down) for the symptoms. The treatment provided mild relief.       Patient Active Problem List   Diagnosis    Acid reflux    Bilateral sensorineural hearing loss    Osteoporosis    Prediabetes    Colon cancer screening    Eczema    Cervical strain    Encounter for screening mammogram for breast cancer    Eczema of hand    Myofascial pain    Arm weakness-rotator cuff weakness    Numbness and tingling    Hyperlipidemia    Positive colorectal cancer screening using Cologuard test    Tortuous artery    Generalized idiopathic epilepsy and epileptic syndromes, not intractable, without status epilepticus    Muscle strain of left upper  back       Family History   Problem Relation Age of Onset    Diabetes Mother     Osteoporosis Mother     Heart disease Father     Diabetes Brother     Sleep apnea Brother     Cataracts Maternal Aunt      Past Surgical History:   Procedure Laterality Date    BREAST BIOPSY      COLONOSCOPY  08/20/2019    removed polyps         Current Outpatient Medications:     alendronate (FOSAMAX) 70 MG tablet, TAKE 1 TABLET ONCE WEEKLY IN THE MORNING WITH A FULL GLASS OF WATER ON AN EMPTY STOMACH. DO NOT LIE DOWN FOR AT LEAST 30 MINUTES AFTERWARDS, Disp: 12 tablet, Rfl: 4    aspirin 81 MG Chew, Take 1 tablet (81 mg total) by mouth once daily., Disp: , Rfl: 0    cholecalciferol, vitamin D3, (VITAMIN D3) 1,000 unit Chew, Take 2 tabs daily, Disp: , Rfl:     fluocinonide-emollient (FLUOCINONIDE-E) 0.05 % Crea, Apply topically., Disp: , Rfl:     levetiracetam XR (KEPPRA XR) 500 mg Tb24 24 hr tablet, TK 1 T PO AT BEDTIME, Disp: , Rfl: 2    MULTIVIT,MIN52-FOLIC-VITK-CQ10 ORAL, Take by mouth., Disp: , Rfl:     meloxicam (MOBIC) 7.5 MG tablet, Take 1 tablet (7.5 mg total) by mouth once daily., Disp: 30 tablet, Rfl: 0    tiZANidine (ZANAFLEX) 4 MG tablet, Take 1 tablet (4 mg total) by mouth every 6 (six) hours as needed., Disp: 30 tablet, Rfl: 0    Review of Systems   Constitutional: Negative for appetite change, chills, fatigue and fever.   Respiratory: Negative for cough and shortness of breath.    Cardiovascular: Negative for chest pain.   Gastrointestinal: Negative for abdominal pain and bowel incontinence.   Genitourinary: Negative for bladder incontinence and dysuria.   Musculoskeletal: Positive for back pain and neck pain.   Neurological: Negative for dizziness, tingling, weakness, light-headedness, numbness and headaches.       Objective:   /68   Pulse 64   Temp 98.2 °F (36.8 °C) (Temporal)   Ht 5' (1.524 m)   Wt 55.9 kg (123 lb 3.8 oz)   SpO2 99%   BMI 24.07 kg/m²      Physical Exam  Constitutional:       " General: She is not in acute distress.     Appearance: Normal appearance. She is not ill-appearing.   HENT:      Head: Normocephalic and atraumatic.   Neck:      Musculoskeletal: Normal range of motion and neck supple. No muscular tenderness.   Cardiovascular:      Rate and Rhythm: Normal rate and regular rhythm.      Pulses: Normal pulses.      Heart sounds: Normal heart sounds. No murmur. No friction rub. No gallop.    Pulmonary:      Effort: Pulmonary effort is normal. No respiratory distress.      Breath sounds: Normal breath sounds.   Musculoskeletal: Normal range of motion.      Cervical back: She exhibits tenderness and spasm. She exhibits normal range of motion and no bony tenderness.        Back:       Right lower leg: No edema.      Left lower leg: No edema.   Lymphadenopathy:      Cervical: No cervical adenopathy.   Skin:     General: Skin is warm and dry.      Coloration: Skin is not pale.      Findings: No erythema.   Neurological:      General: No focal deficit present.      Mental Status: She is alert and oriented to person, place, and time.         Assessment & Plan     Problem List Items Addressed This Visit        Neuro    Generalized idiopathic epilepsy and epileptic syndromes, not intractable, without status epilepticus    Current Assessment & Plan     Stable. Continue Keppra            Cardiac/Vascular    Tortuous artery    Overview     Carotid ultrasound performed 1/21/19         Current Assessment & Plan     Blood pressure well controlled. Stable.             Orthopedic    Muscle strain of left upper back - Primary    Current Assessment & Plan     Stretches  Massages  Heating pad  NSAID and muscle relaxer         Relevant Medications    tiZANidine (ZANAFLEX) 4 MG tablet    meloxicam (MOBIC) 7.5 MG tablet        Follow up if symptoms worsen or fail to improve.            Portions of this note may have been created with voice recognition software. Occasional "wrong-word" or "sound-a-like" " substitutions may have occurred due to the inherent limitations of voice recognition software. Please, read the note carefully and recognize, using context, where substitutions have occurred.

## 2020-08-26 ENCOUNTER — PATIENT MESSAGE (OUTPATIENT)
Dept: INTERNAL MEDICINE | Facility: CLINIC | Age: 70
End: 2020-08-26

## 2020-08-26 DIAGNOSIS — S29.012A MUSCLE STRAIN OF LEFT UPPER BACK, INITIAL ENCOUNTER: ICD-10-CM

## 2020-08-27 NOTE — TELEPHONE ENCOUNTER
She saw Urvashi on 8/25 for muscle strain.   She gave her meloxicam and tizanidine but states it is not helping.   Please advise.

## 2020-08-28 ENCOUNTER — TELEPHONE (OUTPATIENT)
Dept: PHYSICAL MEDICINE AND REHAB | Facility: CLINIC | Age: 70
End: 2020-08-28

## 2020-08-28 ENCOUNTER — TELEPHONE (OUTPATIENT)
Dept: INTERNAL MEDICINE | Facility: CLINIC | Age: 70
End: 2020-08-28

## 2020-08-28 ENCOUNTER — OFFICE VISIT (OUTPATIENT)
Dept: INTERNAL MEDICINE | Facility: CLINIC | Age: 70
End: 2020-08-28
Payer: MEDICARE

## 2020-08-28 DIAGNOSIS — S29.012S MUSCLE STRAIN OF LEFT UPPER BACK, SEQUELA: Primary | ICD-10-CM

## 2020-08-28 PROBLEM — S16.1XXA CERVICAL STRAIN: Status: RESOLVED | Noted: 2018-09-11 | Resolved: 2020-08-28

## 2020-08-28 PROCEDURE — 99213 OFFICE O/P EST LOW 20 MIN: CPT | Mod: 95,,, | Performed by: FAMILY MEDICINE

## 2020-08-28 PROCEDURE — 99213 PR OFFICE/OUTPT VISIT, EST, LEVL III, 20-29 MIN: ICD-10-PCS | Mod: 95,,, | Performed by: FAMILY MEDICINE

## 2020-08-28 RX ORDER — METHYLPREDNISOLONE 4 MG/1
TABLET ORAL
Qty: 1 PACKAGE | Refills: 0 | Status: SHIPPED | OUTPATIENT
Start: 2020-08-28 | End: 2020-09-18

## 2020-08-28 NOTE — TELEPHONE ENCOUNTER
----- Message from Julia Carranza sent at 8/27/2020  4:37 PM CDT -----  Regarding: med advic  .Type:  Needs Medical Advice    Who Called:  pt  Symptoms (please be specific):  pt still in pain    How long has patient had these symptoms:    Pharmacy name and phone #:        Would the patient rather a call back or a response via [facility]? Call back   Best Call Back Number:  [phone] (home)     Additional Information:

## 2020-08-28 NOTE — ASSESSMENT & PLAN NOTE
-No relief with muscle relaxant, nsaid and tylenol. Medrol dose pack sent to pharmacy  -has seen pmr in past for myofascial pain  -she declined PT/OT  -if pain persists recommended PMR follow-up

## 2020-08-28 NOTE — TELEPHONE ENCOUNTER
Returned call to pt. Back pain. Advised pt that she can complete a video visit instead of coming into the clinic

## 2020-08-28 NOTE — PROGRESS NOTES
Patient ID: Rissa Garcia is a 70 y.o. female.    The patient location is: [place], [place]  The chief complaint leading to consultation is: Back pain    Visit type: audiovisual    Face to Face time with patient: 11 minutes  15 minutes of total time spent on the encounter, which includes face to face time and non-face to face time preparing to see the patient (eg, review of tests), Obtaining and/or reviewing separately obtained history, Documenting clinical information in the electronic or other health record, Independently interpreting results (not separately reported) and communicating results to the patient/family/caregiver, or Care coordination (not separately reported).       Each patient to whom he or she provides medical services by telemedicine is:  (1) informed of the relationship between the physician and patient and the respective role of any other health care provider with respect to management of the patient; and (2) notified that he or she may decline to receive medical services by telemedicine and may withdraw from such care at any time.      Chief Complaint: Back Pain    HPI Patient is 70-year-old female seen via virtual visit to follow-up left-sided upper back pain that began approximately 5 days ago.  Denies any inciting event such as injury or malposition. Denies midline pain. Says pain is muscular. Suspect stress is contributing factor.  Seen in clinic 3 days ago and prescribed muscle relaxant (made her sleepy) and Mobic.  Took these medications in addition to Tylenol and over-the-counter analgesic cream and says she has no relief.  Pain worse with standing, walking. No pain with neck ROM. Some relief with sitting still.  No restricted range of motion of neck and arms.  Mild intermittent radiation of pain down her left extremity with associated numbness and tingling.  Had similar pain at right side her back relieved with dry needling however she called physical therapy and insurance company with  not cover dry needling. No fever, chills or extremity weakness. No neck pain or neck stiffness.       Cervical xray 6/2019  The vertebral bodies demonstrate normal height.  There is a couple mm of anterolisthesis of C4 on C5. There is moderate disc space narrowing and spondylosis present at the C5-6 level.  There is also a couple mm of retrolisthesis of C5 on C6.  There is also a few stiff you mm anterolisthesis of C7 on T1.  Bilateral facet arthropathy noted within the mid cervical spine.  Uncovertebral joint hypertrophy noted bilaterally at the C5-6 level resulting in at least mild osseous encroachment on the right and mild-to-moderate osseous scrotum and on the left.  Family History   Problem Relation Age of Onset    Diabetes Mother     Osteoporosis Mother     Heart disease Father     Diabetes Brother     Sleep apnea Brother     Cataracts Maternal Aunt        Current Outpatient Medications:     alendronate (FOSAMAX) 70 MG tablet, TAKE 1 TABLET ONCE WEEKLY IN THE MORNING WITH A FULL GLASS OF WATER ON AN EMPTY STOMACH. DO NOT LIE DOWN FOR AT LEAST 30 MINUTES AFTERWARDS, Disp: 12 tablet, Rfl: 4    aspirin 81 MG Chew, Take 1 tablet (81 mg total) by mouth once daily., Disp: , Rfl: 0    cholecalciferol, vitamin D3, (VITAMIN D3) 1,000 unit Chew, Take 2 tabs daily, Disp: , Rfl:     fluocinonide-emollient (FLUOCINONIDE-E) 0.05 % Crea, Apply topically., Disp: , Rfl:     levetiracetam XR (KEPPRA XR) 500 mg Tb24 24 hr tablet, TK 1 T PO AT BEDTIME, Disp: , Rfl: 2    meloxicam (MOBIC) 7.5 MG tablet, Take 1 tablet (7.5 mg total) by mouth once daily., Disp: 30 tablet, Rfl: 0    methylPREDNISolone (MEDROL DOSEPACK) 4 mg tablet, use as directed, Disp: 1 Package, Rfl: 0    MULTIVIT,MIN52-FOLIC-VITK-CQ10 ORAL, Take by mouth., Disp: , Rfl:     tiZANidine (ZANAFLEX) 4 MG tablet, Take 1 tablet (4 mg total) by mouth every 6 (six) hours as needed., Disp: 30 tablet, Rfl: 0    Review of Systems   Constitutional: Negative for  chills and fever.   Musculoskeletal: Positive for back pain. Negative for joint swelling, neck pain and neck stiffness.   Neurological: Negative for weakness.       Objective:   There were no vitals taken for this visit.     Physical Exam  Constitutional:       General: She is not in acute distress.     Appearance: She is well-developed. She is not diaphoretic.      Comments: no acute distress   Neck:      Comments: Full active ROM intact (no pain elicited)  Pulmonary:      Effort: Pulmonary effort is normal. No respiratory distress.   Musculoskeletal:      Comments: Able to raise arms above head with no difficulty   Neurological:      Mental Status: She is alert and oriented to person, place, and time.   Psychiatric:         Behavior: Behavior normal.         Thought Content: Thought content normal.         Judgment: Judgment normal.         Assessment & Plan   Rissa was seen today for back pain.    Diagnoses and all orders for this visit:    Muscle strain of left upper back, sequela  -     methylPREDNISolone (MEDROL DOSEPACK) 4 mg tablet; use as directed        -     No relief with muscle relaxant, nsaid and tylenol. Medrol dose pack sent to pharmacy        -     has seen pmr in past for myofascial pain        -     she declined PT/OT  -     if pain persists recommended PMR follow-up        Disclaimer:  This note may have been prepared using voice recognition software, without extensive proofreading. As such, there could be errors within the text such as sound alike errors.

## 2020-08-28 NOTE — TELEPHONE ENCOUNTER
----- Message from Mehran Ocampo sent at 8/28/2020 10:03 AM CDT -----  Pt would like return call ,states she has been waiting for virtual appt to begin.  Pleaselcall back a [phone].  Nora Tariq

## 2020-08-28 NOTE — TELEPHONE ENCOUNTER
I called the pt and she already has an appt scheduled for this morning regarding her back pain. I verbalized understanding . //kah

## 2020-08-28 NOTE — TELEPHONE ENCOUNTER
----- Message from Racheal Can sent at 8/28/2020  7:47 AM CDT -----  Regarding: Back pain  Contact: Patient  Please have the nurse to call patient concerning her sever back pain, she states she was sent to therapy and had needles placed in her back, last time and that was the only thing that worked and would like to have that done again but in the [place] area. Please call to advise at Ph .[phone] (home)

## 2020-08-28 NOTE — TELEPHONE ENCOUNTER
Pt called in stating she is having back pain again. Pt states [facility] Offered pt appt with  for 2 pm today for re-eval since pt hasn't been seen since 2018. [place] and weakness, she doesn't have anybody who can help her. Pt states PT hasn't helped and her insurance is not covering. Pt offered order for restarting PT, but patient declined. Asked pt how can I assist her in care, and she couldn't answer specifically. After 20-30min, pt stated she will see [facility] [place] [facility]. Pt wished best luck and feel better.

## 2020-08-31 ENCOUNTER — TELEPHONE (OUTPATIENT)
Dept: INTERNAL MEDICINE | Facility: CLINIC | Age: 70
End: 2020-08-31

## 2020-08-31 DIAGNOSIS — M79.18 MYOFASCIAL PAIN: Primary | ICD-10-CM

## 2020-08-31 NOTE — TELEPHONE ENCOUNTER
Pt called requesting a pain shot. Pt offered appt with  at 1:40 pt declines stated she needed to have a pain shot now. Nurse informed pt there where no appts opening at time of call before lunch. Nurse informed pt she could get her scheduled for next available after lunch 1:40 pt declined and stated she wanted to be seen now, nurse advised pt if she's in extreme pain and needs to be seen before lunch she could go to ER for evaluation for pain. Pt agreed and stated she will find another doctor to go to.

## 2020-09-01 ENCOUNTER — TELEPHONE (OUTPATIENT)
Dept: PHYSICAL MEDICINE AND REHAB | Facility: CLINIC | Age: 70
End: 2020-09-01

## 2020-09-01 NOTE — TELEPHONE ENCOUNTER
----- Message from Tabitha Oseguera sent at 9/1/2020  6:36 AM CDT -----  Regarding: Pain  Contact: patient  Patient state s that she is in a lot of back pain and needs to know what she can do to relieve it, its difficult for her to get to [place] because she cant drive, please call her back at [phone]

## 2020-09-02 ENCOUNTER — NURSE TRIAGE (OUTPATIENT)
Dept: ADMINISTRATIVE | Facility: CLINIC | Age: 70
End: 2020-09-02

## 2020-09-02 NOTE — TELEPHONE ENCOUNTER
"Pt reports pain in left shoulder since last Tuesday.   Patient was given Mobic and Zanaflex. Pt states that medications are not helping. Pt has tried applying heat and ice with no relief. Pt taking Tylenol and has no relief.   Patient did virtual visit on Friday and given steroid pack, reports that is not working. Pt got referral to physical therapy and reports that PT has given her some relief. Pt reports pain started on 8/23/20. She denies trauma to shoulder.  Pain is worse at night and when she stands.  Pt states she went to ED after PT yesterday, but left because the staff would not tell her if her visit was 100% covered.  Patient asked to rate pain on a scale of 0-10. Patient rates pain at a "40." She describes it as severe and radiates down her arm and into her back and chest. Patient advised to go to ED per protocol, but refuses to go to ED. She will have someone drive her to  tomorrow for a steroid injection. Patient is very frustrated and venting saying she feels like no one is helping her. I assured patient that my job is to help and that the protocol recommends she go to ED for treatment as she reports severe pain. Patient states she is not going to the ED and her shoulder pain is starting to subside. Patient urged to call back or go to ED if symptoms worsen.   Reason for Disposition   [1] SEVERE pain AND [2] not improved 2 hours after pain medicine    Additional Information   Negative: Passed out (i.e., lost consciousness, collapsed and was not responding)   Negative: Shock suspected (e.g., cold/pale/clammy skin, too weak to stand, low BP, rapid pulse)   Negative: [1] Similar pain previously AND [2] it was from "heart attack"   Negative: [1] Similar pain previously AND [2] it was from "angina" AND [3] not relieved by nitroglycerin   Negative: Sounds like a life-threatening emergency to the triager   Negative: Difficulty breathing or unusual sweating (e.g., sweating without exertion)   Negative: " [1] Pain lasting > 5 minutes AND [2] pain also present in chest  (Exception: pain is clearly made worse by movement)   Negative: [1] Age > 40 AND [2] no obvious cause AND [3] pain even when not moving the arm    (Exception: pain is clearly made worse by moving arm or bending neck)    Protocols used: SHOULDER PAIN-A-AH

## 2020-09-03 ENCOUNTER — PATIENT OUTREACH (OUTPATIENT)
Dept: ADMINISTRATIVE | Facility: HOSPITAL | Age: 70
End: 2020-09-03

## 2020-09-08 RX ORDER — MELOXICAM 7.5 MG/1
7.5 TABLET ORAL DAILY
Qty: 30 TABLET | Refills: 0 | Status: SHIPPED | OUTPATIENT
Start: 2020-09-08 | End: 2020-09-11

## 2020-09-09 ENCOUNTER — TELEPHONE (OUTPATIENT)
Dept: INTERNAL MEDICINE | Facility: CLINIC | Age: 70
End: 2020-09-09

## 2020-09-09 NOTE — TELEPHONE ENCOUNTER
----- Message from Katharine Watson sent at 9/9/2020  9:47 AM CDT -----  Pt called in and would like her anti inflammatory meds refilled and called into [facility] in [place].

## 2020-09-10 DIAGNOSIS — S29.012A MUSCLE STRAIN OF LEFT UPPER BACK, INITIAL ENCOUNTER: ICD-10-CM

## 2020-09-10 NOTE — TELEPHONE ENCOUNTER
----- Message from Gina Benjamin sent at 9/10/2020  4:53 PM CDT -----  Contact: Jimena  Type:  Pharmacy Calling to Clarify an RX    Name of Caller:Rafael  Pharmacy Name:[facility]  Prescription Name:meloxicam (MOBIC) 7.5 MG tablet  What do they need to clarify?:refills  Best Call Back Number:[phone]  Additional Information: pt is wanting the prescription sent there instead        [id number] - [place] [id number] [place] [address] AT [address] [address]  Phone: [phone] Fax: [phone]

## 2020-09-11 RX ORDER — MELOXICAM 7.5 MG/1
7.5 TABLET ORAL 2 TIMES DAILY
Qty: 60 TABLET | Refills: 0 | Status: SHIPPED | OUTPATIENT
Start: 2020-09-11 | End: 2020-10-11

## 2020-09-17 ENCOUNTER — PATIENT MESSAGE (OUTPATIENT)
Dept: INTERNAL MEDICINE | Facility: CLINIC | Age: 70
End: 2020-09-17

## 2020-09-17 DIAGNOSIS — S29.012A MUSCLE STRAIN OF LEFT UPPER BACK, INITIAL ENCOUNTER: ICD-10-CM

## 2020-09-18 RX ORDER — TIZANIDINE 4 MG/1
4 TABLET ORAL EVERY 6 HOURS PRN
Qty: 30 TABLET | Refills: 0 | Status: SHIPPED | OUTPATIENT
Start: 2020-09-18

## 2020-09-18 NOTE — TELEPHONE ENCOUNTER
What is she referring to an MRI machine for? I do not see an order for an MRI. Not sure if that is what she meant.

## 2020-09-23 ENCOUNTER — HOSPITAL ENCOUNTER (OUTPATIENT)
Dept: RADIOLOGY | Facility: HOSPITAL | Age: 70
Discharge: HOME | End: 2020-09-23
Attending: PSYCHIATRY & NEUROLOGY
Payer: MEDICARE

## 2020-09-23 DIAGNOSIS — D68.59 HYPERCOAGULOPATHY: ICD-10-CM

## 2020-09-23 DIAGNOSIS — M54.9 UPPER BACK PAIN: ICD-10-CM

## 2020-09-23 DIAGNOSIS — M54.2 NECK PAIN: ICD-10-CM

## 2020-09-23 DIAGNOSIS — M25.512 PAIN IN LEFT SHOULDER: ICD-10-CM

## 2020-09-23 PROCEDURE — 73030 X-RAY EXAM OF SHOULDER: CPT | Mod: TC,PO,LT

## 2020-09-23 PROCEDURE — 73030 X-RAY EXAM OF SHOULDER: CPT | Mod: 26,LT,, | Performed by: RADIOLOGY

## 2020-09-23 PROCEDURE — 73030 XR SHOULDER COMPLETE 2 OR MORE VIEWS LEFT: ICD-10-PCS | Mod: 26,LT,, | Performed by: RADIOLOGY

## 2020-09-24 ENCOUNTER — TELEPHONE (OUTPATIENT)
Dept: INTERNAL MEDICINE | Facility: CLINIC | Age: 70
End: 2020-09-24

## 2020-09-24 DIAGNOSIS — Z12.31 ENCOUNTER FOR SCREENING MAMMOGRAM FOR BREAST CANCER: Primary | ICD-10-CM

## 2020-09-24 NOTE — TELEPHONE ENCOUNTER
----- Message from Jossie Rosario sent at 9/24/2020  9:54 AM CDT -----  Contact: pt  Type:  Patient Returning Call    Who Called: pt  Who Left Message for Patient: unknown   Does the patient know what this is regarding?: no  Would the patient rather a call back or a response via [facility]? Call back  Best Call Back Number: [phone]  Additional Information: n/a

## 2020-09-24 NOTE — TELEPHONE ENCOUNTER
Spoke with pt about mammogram. Pt would like to have mammogram order put in to get scheduled for next year.

## 2020-09-24 NOTE — TELEPHONE ENCOUNTER
----- Message from Lulu Canchola sent at 9/24/2020  9:34 AM CDT -----  Contact: June June would like a call back at [phone], Regards to how often should she have her mammogram done.    Thanks  Td

## 2020-09-30 ENCOUNTER — HOSPITAL ENCOUNTER (OUTPATIENT)
Dept: RADIOLOGY | Facility: HOSPITAL | Age: 70
Discharge: HOME | End: 2020-09-30
Attending: PSYCHIATRY & NEUROLOGY
Payer: MEDICARE

## 2020-09-30 DIAGNOSIS — M54.9 UPPER BACK PAIN: ICD-10-CM

## 2020-09-30 DIAGNOSIS — M54.2 NECK PAIN: ICD-10-CM

## 2020-09-30 DIAGNOSIS — D68.59 HYPERCOAGULOPATHY: ICD-10-CM

## 2020-09-30 DIAGNOSIS — M25.512 PAIN IN LEFT SHOULDER: ICD-10-CM

## 2020-09-30 PROCEDURE — 72141 MRI CERVICAL SPINE WITHOUT CONTRAST: ICD-10-PCS | Mod: 26,,, | Performed by: RADIOLOGY

## 2020-09-30 PROCEDURE — 72141 MRI NECK SPINE W/O DYE: CPT | Mod: 26,,, | Performed by: RADIOLOGY

## 2020-09-30 PROCEDURE — 72141 MRI NECK SPINE W/O DYE: CPT | Mod: TC,PO

## 2020-11-10 ENCOUNTER — PES CALL (OUTPATIENT)
Dept: ADMINISTRATIVE | Facility: CLINIC | Age: 70
End: 2020-11-10

## 2020-12-10 ENCOUNTER — TELEPHONE (OUTPATIENT)
Dept: INTERNAL MEDICINE | Facility: CLINIC | Age: 70
End: 2020-12-10

## 2020-12-10 NOTE — TELEPHONE ENCOUNTER
Pt called stating Dr. Nuria Foss had her go through physical therapy at [facility] in [facility] in August-October.  She states the pain is back and would like another order to be sent to [facility] as soon as possible.  She states she would like to start tomorrow.  Her phone number is [phone]. Thanks    Lov: 08/28/2020 Dr. Munoz can we place an order for external PT?

## 2020-12-11 ENCOUNTER — TELEPHONE (OUTPATIENT)
Dept: PHYSICAL MEDICINE AND REHAB | Facility: CLINIC | Age: 70
End: 2020-12-11

## 2020-12-11 DIAGNOSIS — M79.18 MYOFASCIAL PAIN: Primary | ICD-10-CM

## 2020-12-11 NOTE — TELEPHONE ENCOUNTER
Bernardo Landin,     Advised pt on needing an appt. Pt states she cannot come in for an appt. She has to go to Physical therapy today since she is in pain. Pt is requesting you just order this for her. Wants this addressed asap. Advised you are the only V[facility]AL/doctor here on site today and you have pts so will address this as soon as we can. She states she needs to know asap bc if not she will just go to [facility].

## 2021-01-22 ENCOUNTER — PATIENT MESSAGE (OUTPATIENT)
Dept: PEDIATRICS | Facility: CLINIC | Age: 71
End: 2021-01-22

## 2021-01-27 ENCOUNTER — TELEPHONE (OUTPATIENT)
Dept: ADMINISTRATIVE | Facility: HOSPITAL | Age: 71
End: 2021-01-27

## 2021-01-27 ENCOUNTER — TELEPHONE (OUTPATIENT)
Dept: INTERNAL MEDICINE | Facility: CLINIC | Age: 71
End: 2021-01-27

## 2021-02-12 ENCOUNTER — PATIENT MESSAGE (OUTPATIENT)
Dept: ADMINISTRATIVE | Facility: HOSPITAL | Age: 71
End: 2021-02-12

## 2021-04-29 ENCOUNTER — PATIENT MESSAGE (OUTPATIENT)
Dept: RESEARCH | Facility: HOSPITAL | Age: 71
End: 2021-04-29

## 2021-05-12 ENCOUNTER — TELEPHONE (OUTPATIENT)
Dept: PHYSICAL MEDICINE AND REHAB | Facility: CLINIC | Age: 71
End: 2021-05-12

## 2021-05-12 ENCOUNTER — PATIENT MESSAGE (OUTPATIENT)
Dept: PHYSICAL MEDICINE AND REHAB | Facility: CLINIC | Age: 71
End: 2021-05-12

## 2021-05-12 DIAGNOSIS — M79.18 MYOFASCIAL PAIN: Primary | ICD-10-CM

## 2021-05-12 DIAGNOSIS — M47.812 SPONDYLOSIS OF CERVICAL REGION WITHOUT MYELOPATHY OR RADICULOPATHY: ICD-10-CM

## 2021-05-12 DIAGNOSIS — M47.816 SPONDYLOSIS OF LUMBAR REGION WITHOUT MYELOPATHY OR RADICULOPATHY: ICD-10-CM

## 2021-08-16 ENCOUNTER — HOSPITAL ENCOUNTER (EMERGENCY)
Facility: HOSPITAL | Age: 71
Discharge: LEFT BEFORE DISCHARGE | End: 2021-08-16
Attending: EMERGENCY MEDICINE
Payer: MEDICARE

## 2021-08-16 VITALS
HEART RATE: 71 BPM | RESPIRATION RATE: 20 BRPM | OXYGEN SATURATION: 99 % | BODY MASS INDEX: 24.68 KG/M2 | WEIGHT: 125.69 LBS | DIASTOLIC BLOOD PRESSURE: 89 MMHG | SYSTOLIC BLOOD PRESSURE: 174 MMHG | TEMPERATURE: 99 F | HEIGHT: 60 IN

## 2021-08-16 DIAGNOSIS — I63.9 STROKE: ICD-10-CM

## 2021-08-16 DIAGNOSIS — H53.2 DIPLOPIA: ICD-10-CM

## 2021-08-16 LAB
ALBUMIN SERPL BCP-MCNC: 4.4 G/DL (ref 3.5–5.2)
ALP SERPL-CCNC: 67 U/L (ref 55–135)
ALT SERPL W/O P-5'-P-CCNC: 18 U/L (ref 10–44)
ANION GAP SERPL CALC-SCNC: 14 MMOL/L (ref 8–16)
AST SERPL-CCNC: 15 U/L (ref 10–40)
BASOPHILS # BLD AUTO: 0.03 K/UL (ref 0–0.2)
BASOPHILS NFR BLD: 0.5 % (ref 0–1.9)
BILIRUB SERPL-MCNC: 0.6 MG/DL (ref 0.1–1)
BNP SERPL-MCNC: 16 PG/ML (ref 0–99)
BUN SERPL-MCNC: 12 MG/DL (ref 8–23)
CALCIUM SERPL-MCNC: 9.8 MG/DL (ref 8.7–10.5)
CHLORIDE SERPL-SCNC: 102 MMOL/L (ref 95–110)
CO2 SERPL-SCNC: 23 MMOL/L (ref 23–29)
CREAT SERPL-MCNC: 1 MG/DL (ref 0.5–1.4)
DIFFERENTIAL METHOD: NORMAL
EOSINOPHIL # BLD AUTO: 0 K/UL (ref 0–0.5)
EOSINOPHIL NFR BLD: 0.2 % (ref 0–8)
ERYTHROCYTE [DISTWIDTH] IN BLOOD BY AUTOMATED COUNT: 13.9 % (ref 11.5–14.5)
EST. GFR  (AFRICAN AMERICAN): >60 ML/MIN/1.73 M^2
EST. GFR  (NON AFRICAN AMERICAN): 56.4 ML/MIN/1.73 M^2
GLUCOSE SERPL-MCNC: 115 MG/DL (ref 70–110)
HCT VFR BLD AUTO: 41.9 % (ref 37–48.5)
HGB BLD-MCNC: 13.9 G/DL (ref 12–16)
IMM GRANULOCYTES # BLD AUTO: 0.01 K/UL (ref 0–0.04)
IMM GRANULOCYTES NFR BLD AUTO: 0.2 % (ref 0–0.5)
LYMPHOCYTES # BLD AUTO: 1.2 K/UL (ref 1–4.8)
LYMPHOCYTES NFR BLD: 22.2 % (ref 18–48)
MCH RBC QN AUTO: 29.4 PG (ref 27–31)
MCHC RBC AUTO-ENTMCNC: 33.2 G/DL (ref 32–36)
MCV RBC AUTO: 89 FL (ref 82–98)
MONOCYTES # BLD AUTO: 0.5 K/UL (ref 0.3–1)
MONOCYTES NFR BLD: 8.7 % (ref 4–15)
NEUTROPHILS # BLD AUTO: 3.8 K/UL (ref 1.8–7.7)
NEUTROPHILS NFR BLD: 68.2 % (ref 38–73)
NRBC BLD-RTO: 0 /100 WBC
PLATELET # BLD AUTO: 256 K/UL (ref 150–450)
PMV BLD AUTO: 10.6 FL (ref 9.2–12.9)
POTASSIUM SERPL-SCNC: 3.9 MMOL/L (ref 3.5–5.1)
PROT SERPL-MCNC: 8.2 G/DL (ref 6–8.4)
RBC # BLD AUTO: 4.72 M/UL (ref 4–5.4)
SODIUM SERPL-SCNC: 139 MMOL/L (ref 136–145)
TROPONIN I SERPL DL<=0.01 NG/ML-MCNC: 0.01 NG/ML (ref 0–0.03)
TSH SERPL DL<=0.005 MIU/L-ACNC: 1.43 UIU/ML (ref 0.4–4)
WBC # BLD AUTO: 5.54 K/UL (ref 3.9–12.7)

## 2021-08-16 PROCEDURE — 84484 ASSAY OF TROPONIN QUANT: CPT | Mod: ER | Performed by: EMERGENCY MEDICINE

## 2021-08-16 PROCEDURE — 99285 EMERGENCY DEPT VISIT HI MDM: CPT | Mod: 25,ER

## 2021-08-16 PROCEDURE — 83880 ASSAY OF NATRIURETIC PEPTIDE: CPT | Mod: ER | Performed by: EMERGENCY MEDICINE

## 2021-08-16 PROCEDURE — 85025 COMPLETE CBC W/AUTO DIFF WBC: CPT | Mod: ER | Performed by: EMERGENCY MEDICINE

## 2021-08-16 PROCEDURE — 80053 COMPREHEN METABOLIC PANEL: CPT | Mod: ER | Performed by: EMERGENCY MEDICINE

## 2021-08-16 PROCEDURE — 93010 ELECTROCARDIOGRAM REPORT: CPT | Mod: ,,, | Performed by: INTERNAL MEDICINE

## 2021-08-16 PROCEDURE — 25500020 PHARM REV CODE 255: Mod: ER | Performed by: EMERGENCY MEDICINE

## 2021-08-16 PROCEDURE — 80061 LIPID PANEL: CPT | Performed by: EMERGENCY MEDICINE

## 2021-08-16 PROCEDURE — 93005 ELECTROCARDIOGRAM TRACING: CPT | Mod: ER

## 2021-08-16 PROCEDURE — 84443 ASSAY THYROID STIM HORMONE: CPT | Mod: ER | Performed by: EMERGENCY MEDICINE

## 2021-08-16 PROCEDURE — 93010 EKG 12-LEAD: ICD-10-PCS | Mod: ,,, | Performed by: INTERNAL MEDICINE

## 2021-08-16 RX ADMIN — IOHEXOL 100 ML: 350 INJECTION, SOLUTION INTRAVENOUS at 04:08

## 2021-08-17 LAB
CHOLEST SERPL-MCNC: 264 MG/DL (ref 120–199)
CHOLEST/HDLC SERPL: 4.7 {RATIO} (ref 2–5)
HDLC SERPL-MCNC: 56 MG/DL (ref 40–75)
HDLC SERPL: 21.2 % (ref 20–50)
LDLC SERPL CALC-MCNC: 170.8 MG/DL (ref 63–159)
NONHDLC SERPL-MCNC: 208 MG/DL
TRIGL SERPL-MCNC: 186 MG/DL (ref 30–150)

## 2021-09-16 DIAGNOSIS — R73.03 DIABETES MELLITUS, LATENT: Primary | ICD-10-CM

## 2021-09-22 ENCOUNTER — LAB VISIT (OUTPATIENT)
Dept: LAB | Facility: HOSPITAL | Age: 71
End: 2021-09-22
Attending: PSYCHIATRY & NEUROLOGY
Payer: MEDICARE

## 2021-09-22 DIAGNOSIS — R73.03 DIABETES MELLITUS, LATENT: ICD-10-CM

## 2021-09-22 LAB
ESTIMATED AVG GLUCOSE: 131 MG/DL (ref 68–131)
HBA1C MFR BLD: 6.2 % (ref 4–5.6)

## 2021-09-22 PROCEDURE — 36415 COLL VENOUS BLD VENIPUNCTURE: CPT | Mod: PO | Performed by: PSYCHIATRY & NEUROLOGY

## 2021-09-22 PROCEDURE — 83036 HEMOGLOBIN GLYCOSYLATED A1C: CPT | Performed by: PSYCHIATRY & NEUROLOGY

## 2021-09-27 ENCOUNTER — LAB VISIT (OUTPATIENT)
Dept: LAB | Facility: HOSPITAL | Age: 71
End: 2021-09-27
Attending: OPHTHALMOLOGY
Payer: MEDICARE

## 2021-09-27 DIAGNOSIS — M31.0 GOODPASTURE'S SYNDROME: ICD-10-CM

## 2021-09-27 DIAGNOSIS — M31.0 GOODPASTURE'S SYNDROME: Primary | ICD-10-CM

## 2021-09-27 LAB — CRP SERPL-MCNC: 0.7 MG/L (ref 0–8.2)

## 2021-09-27 PROCEDURE — 86140 C-REACTIVE PROTEIN: CPT | Mod: PO | Performed by: OPHTHALMOLOGY

## 2021-09-27 PROCEDURE — 36415 COLL VENOUS BLD VENIPUNCTURE: CPT | Mod: PO | Performed by: OPHTHALMOLOGY

## 2021-09-27 PROCEDURE — 85652 RBC SED RATE AUTOMATED: CPT | Performed by: OPHTHALMOLOGY

## 2021-09-28 LAB — ERYTHROCYTE [SEDIMENTATION RATE] IN BLOOD BY WESTERGREN METHOD: 19 MM/HR (ref 0–36)

## 2021-10-25 ENCOUNTER — TELEPHONE (OUTPATIENT)
Dept: INTERNAL MEDICINE | Facility: CLINIC | Age: 71
End: 2021-10-25
Payer: MEDICARE

## 2022-01-11 ENCOUNTER — PATIENT MESSAGE (OUTPATIENT)
Dept: INTERNAL MEDICINE | Facility: CLINIC | Age: 72
End: 2022-01-11
Payer: MEDICARE

## 2022-02-04 ENCOUNTER — OFFICE VISIT (OUTPATIENT)
Dept: INTERNAL MEDICINE | Facility: CLINIC | Age: 72
End: 2022-02-04
Payer: MEDICARE

## 2022-02-04 ENCOUNTER — PATIENT MESSAGE (OUTPATIENT)
Dept: INTERNAL MEDICINE | Facility: CLINIC | Age: 72
End: 2022-02-04

## 2022-02-04 ENCOUNTER — LAB VISIT (OUTPATIENT)
Dept: LAB | Facility: HOSPITAL | Age: 72
End: 2022-02-04
Attending: NURSE PRACTITIONER
Payer: MEDICARE

## 2022-02-04 ENCOUNTER — PATIENT MESSAGE (OUTPATIENT)
Dept: INTERNAL MEDICINE | Facility: CLINIC | Age: 72
End: 2022-02-04
Payer: MEDICARE

## 2022-02-04 VITALS
HEIGHT: 60 IN | HEART RATE: 80 BPM | DIASTOLIC BLOOD PRESSURE: 82 MMHG | WEIGHT: 128.06 LBS | SYSTOLIC BLOOD PRESSURE: 124 MMHG | TEMPERATURE: 99 F | OXYGEN SATURATION: 97 % | BODY MASS INDEX: 25.14 KG/M2

## 2022-02-04 DIAGNOSIS — T14.8XXA BRUISING: ICD-10-CM

## 2022-02-04 DIAGNOSIS — K14.8 TONGUE LESION: Primary | ICD-10-CM

## 2022-02-04 LAB
ALBUMIN SERPL BCP-MCNC: 4.2 G/DL (ref 3.5–5.2)
ALP SERPL-CCNC: 81 U/L (ref 55–135)
ALT SERPL W/O P-5'-P-CCNC: 33 U/L (ref 10–44)
ANION GAP SERPL CALC-SCNC: 12 MMOL/L (ref 8–16)
AST SERPL-CCNC: 55 U/L (ref 10–40)
BASOPHILS # BLD AUTO: 0.03 K/UL (ref 0–0.2)
BASOPHILS NFR BLD: 0.5 % (ref 0–1.9)
BILIRUB SERPL-MCNC: 0.6 MG/DL (ref 0.1–1)
BUN SERPL-MCNC: 9 MG/DL (ref 8–23)
CALCIUM SERPL-MCNC: 9.5 MG/DL (ref 8.7–10.5)
CHLORIDE SERPL-SCNC: 103 MMOL/L (ref 95–110)
CO2 SERPL-SCNC: 24 MMOL/L (ref 23–29)
CREAT SERPL-MCNC: 0.9 MG/DL (ref 0.5–1.4)
DIFFERENTIAL METHOD: NORMAL
EOSINOPHIL # BLD AUTO: 0 K/UL (ref 0–0.5)
EOSINOPHIL NFR BLD: 0.2 % (ref 0–8)
ERYTHROCYTE [DISTWIDTH] IN BLOOD BY AUTOMATED COUNT: 13.7 % (ref 11.5–14.5)
EST. GFR  (AFRICAN AMERICAN): >60 ML/MIN/1.73 M^2
EST. GFR  (NON AFRICAN AMERICAN): >60 ML/MIN/1.73 M^2
GLUCOSE SERPL-MCNC: 121 MG/DL (ref 70–110)
HCT VFR BLD AUTO: 41.8 % (ref 37–48.5)
HGB BLD-MCNC: 13.8 G/DL (ref 12–16)
IMM GRANULOCYTES # BLD AUTO: 0.02 K/UL (ref 0–0.04)
IMM GRANULOCYTES NFR BLD AUTO: 0.4 % (ref 0–0.5)
LYMPHOCYTES # BLD AUTO: 1 K/UL (ref 1–4.8)
LYMPHOCYTES NFR BLD: 18.6 % (ref 18–48)
MCH RBC QN AUTO: 29.5 PG (ref 27–31)
MCHC RBC AUTO-ENTMCNC: 33 G/DL (ref 32–36)
MCV RBC AUTO: 89 FL (ref 82–98)
MONOCYTES # BLD AUTO: 0.5 K/UL (ref 0.3–1)
MONOCYTES NFR BLD: 9.3 % (ref 4–15)
NEUTROPHILS # BLD AUTO: 3.9 K/UL (ref 1.8–7.7)
NEUTROPHILS NFR BLD: 71 % (ref 38–73)
NRBC BLD-RTO: 0 /100 WBC
PLATELET # BLD AUTO: 225 K/UL (ref 150–450)
PMV BLD AUTO: 10.3 FL (ref 9.2–12.9)
POTASSIUM SERPL-SCNC: 3.9 MMOL/L (ref 3.5–5.1)
PROT SERPL-MCNC: 7.7 G/DL (ref 6–8.4)
RBC # BLD AUTO: 4.68 M/UL (ref 4–5.4)
SODIUM SERPL-SCNC: 139 MMOL/L (ref 136–145)
WBC # BLD AUTO: 5.49 K/UL (ref 3.9–12.7)

## 2022-02-04 PROCEDURE — 85025 COMPLETE CBC W/AUTO DIFF WBC: CPT | Mod: PO | Performed by: NURSE PRACTITIONER

## 2022-02-04 PROCEDURE — 80053 COMPREHEN METABOLIC PANEL: CPT | Mod: PO | Performed by: NURSE PRACTITIONER

## 2022-02-04 PROCEDURE — 36415 COLL VENOUS BLD VENIPUNCTURE: CPT | Mod: PO | Performed by: NURSE PRACTITIONER

## 2022-02-04 PROCEDURE — 99215 OFFICE O/P EST HI 40 MIN: CPT | Mod: PBBFAC,PO | Performed by: NURSE PRACTITIONER

## 2022-02-04 PROCEDURE — 99214 PR OFFICE/OUTPT VISIT, EST, LEVL IV, 30-39 MIN: ICD-10-PCS | Mod: S$PBB,,, | Performed by: NURSE PRACTITIONER

## 2022-02-04 PROCEDURE — 99214 OFFICE O/P EST MOD 30 MIN: CPT | Mod: S$PBB,,, | Performed by: NURSE PRACTITIONER

## 2022-02-04 PROCEDURE — 99999 PR PBB SHADOW E&M-EST. PATIENT-LVL V: CPT | Mod: PBBFAC,,, | Performed by: NURSE PRACTITIONER

## 2022-02-04 PROCEDURE — 99999 PR PBB SHADOW E&M-EST. PATIENT-LVL V: ICD-10-PCS | Mod: PBBFAC,,, | Performed by: NURSE PRACTITIONER

## 2022-02-04 RX ORDER — CLOPIDOGREL BISULFATE 75 MG/1
75 TABLET ORAL DAILY
COMMUNITY
Start: 2022-01-03

## 2022-02-04 RX ORDER — PANTOPRAZOLE SODIUM 40 MG
TABLET, DELAYED RELEASE (ENTERIC COATED) ORAL
COMMUNITY
Start: 2021-08-17

## 2022-02-04 NOTE — TELEPHONE ENCOUNTER
Call patient with result and informed there was other labs that needs to be completed. Patient continue with concerns, call was then handle by NP.    Patient states will see if she can get someone to bring her in to get the labs done

## 2022-02-04 NOTE — PROGRESS NOTES
Subjective:       Patient ID: Rissa Garcia is a 71 y.o. female.    Chief Complaint: Joint Swelling (Left hand) and Spots and/or Floaters (tongue)    Mrs. Garcia presents to visit for complaint of bleeding and lesions in mouth since Wednesday. No bleeding today, but does have abnormal coloring and sores to tongue. She has been on plavix since October from neuro for TIA. She also has been having L hand swelling and bruising since Wednesday, gradually worsening. No known injury. Denies fall. Swelling and bruising is localized to L lateral hand and 4th and 5th digit. Minimal pain, rated 2/10 on numeric scale from swelling. Denies tenderness. Denies blood in urine, blood in stool, or any other signs of bleeding.       Patient Active Problem List   Diagnosis    Acid reflux    Bilateral sensorineural hearing loss    Osteoporosis    Prediabetes    Colon cancer screening    Eczema    Encounter for screening mammogram for breast cancer    Eczema of hand    Myofascial pain    Arm weakness-rotator cuff weakness    Numbness and tingling    Hyperlipidemia    Positive colorectal cancer screening using Cologuard test    Tortuous artery    Generalized idiopathic epilepsy and epileptic syndromes, not intractable, without status epilepticus    Muscle strain of left upper back    Tongue lesion    Bruising       Family History   Problem Relation Age of Onset    Diabetes Mother     Osteoporosis Mother     Heart disease Father     Diabetes Brother     Sleep apnea Brother     Cataracts Maternal Aunt      Past Surgical History:   Procedure Laterality Date    BREAST BIOPSY      COLONOSCOPY  08/20/2019    removed polyps         Current Outpatient Medications:     alendronate (FOSAMAX) 70 MG tablet, TAKE 1 TABLET ONCE WEEKLY IN THE MORNING WITH A FULL GLASS OF WATER ON AN EMPTY STOMACH. DO NOT LIE DOWN FOR AT LEAST 30 MINUTES AFTERWARDS, Disp: 12 tablet, Rfl: 3    cholecalciferol, vitamin D3, 25 mcg (1,000 unit)  Chew, Take 2 tabs daily, Disp: , Rfl:     clopidogreL (PLAVIX) 75 mg tablet, Take 75 mg by mouth once daily., Disp: , Rfl:     fluocinonide-emollient (FLUOCINONIDE-E) 0.05 % Crea, Apply topically., Disp: , Rfl:     levetiracetam XR (KEPPRA XR) 500 mg Tb24 24 hr tablet, TK 1 T PO AT BEDTIME, Disp: , Rfl: 2    MULTIVIT,MIN52-FOLIC-VITK-CQ10 ORAL, Take by mouth., Disp: , Rfl:     PROTONIX 40 mg tablet, Take by mouth., Disp: , Rfl:     aspirin 81 MG Chew, Take 1 tablet (81 mg total) by mouth once daily., Disp: , Rfl: 0    tiZANidine (ZANAFLEX) 4 MG tablet, Take 1 tablet (4 mg total) by mouth every 6 (six) hours as needed. (Patient not taking: Reported on 2/4/2022), Disp: 30 tablet, Rfl: 0    Review of Systems   Constitutional: Negative for appetite change, chills, diaphoresis, fatigue, fever and unexpected weight change.   HENT: Positive for mouth sores.    Respiratory: Negative for cough, chest tightness and shortness of breath.    Cardiovascular: Negative for chest pain and palpitations.   Gastrointestinal: Negative for abdominal pain, blood in stool, constipation, diarrhea, nausea and vomiting.   Genitourinary: Negative for dysuria and hematuria.   Musculoskeletal: Positive for arthralgias and back pain.   Neurological: Negative for dizziness, light-headedness and headaches.   Hematological: Bruises/bleeds easily.       Objective:   /82 (BP Location: Left arm, Patient Position: Sitting, BP Method: Medium (Manual))   Pulse 80   Temp 98.6 °F (37 °C) (Temporal)   Ht 5' (1.524 m)   Wt 58.1 kg (128 lb 1.4 oz)   SpO2 97%   BMI 25.02 kg/m²      Physical Exam  Constitutional:       General: She is not in acute distress.     Appearance: Normal appearance. She is normal weight. She is not ill-appearing.   HENT:      Mouth/Throat:      Mouth: Mucous membranes are moist. No oral lesions.      Tongue: Lesions (see image) present.   Cardiovascular:      Rate and Rhythm: Normal rate and regular rhythm.       "Pulses: Normal pulses.      Heart sounds: Normal heart sounds. No murmur heard.  No friction rub. No gallop.    Pulmonary:      Effort: Pulmonary effort is normal. No respiratory distress.      Breath sounds: Normal breath sounds.   Skin:     General: Skin is warm and dry.      Findings: Bruising (4th and 5th digit of L hand and dorsal aspect of hand, no TTP) present. No erythema.   Neurological:      Mental Status: She is alert and oriented to person, place, and time.                           Assessment & Plan     Problem List Items Addressed This Visit        ENT    Tongue lesion - Primary    Current Assessment & Plan     Referral to ENT to r/o any worrisome lesions. May also be due to plavix.          Relevant Orders    Ambulatory referral/consult to ENT       Orthopedic    Bruising    Current Assessment & Plan     Possibly due to plavix. Denies injury, negative for TTP so do not think imaging is necessary at this time. If no relief, will proceed with imaging. Labs today.          Relevant Orders    CBC Auto Differential (Completed)    Comprehensive Metabolic Panel (Completed)    Protime-INR    APTT        No follow-ups on file.            Portions of this note may have been created with voice recognition software. Occasional "wrong-word" or "sound-a-like" substitutions may have occurred due to the inherent limitations of voice recognition software. Please, read the note carefully and recognize, using context, where substitutions have occurred.       "

## 2022-02-05 ENCOUNTER — PATIENT MESSAGE (OUTPATIENT)
Dept: INTERNAL MEDICINE | Facility: CLINIC | Age: 72
End: 2022-02-05
Payer: MEDICARE

## 2022-02-08 NOTE — TELEPHONE ENCOUNTER
Spoke with patient via telephone. Offered xray for continued swelling of hand that is now causing pain. Has not tried anything for treatment. Swelling has not worsened since visit. Patient declined xray. States that [facility] and plans to see them this week.

## 2022-02-15 PROBLEM — K14.8 TONGUE LESION: Status: ACTIVE | Noted: 2022-02-15

## 2022-02-15 PROBLEM — T14.8XXA BRUISING: Status: ACTIVE | Noted: 2022-02-15

## 2022-02-15 NOTE — ASSESSMENT & PLAN NOTE
Possibly due to plavix. Denies injury, negative for TTP so do not think imaging is necessary at this time. If no relief, will proceed with imaging. Labs today.

## 2022-04-18 ENCOUNTER — TELEPHONE (OUTPATIENT)
Dept: ORTHOPEDICS | Facility: CLINIC | Age: 72
End: 2022-04-18
Payer: MEDICARE

## 2022-04-18 NOTE — TELEPHONE ENCOUNTER
----- Message from Romy Devlin sent at 4/18/2022  1:12 PM CDT -----  Contact: Pt Mobile [phone]  Patient would like a call back in regards to her wanting to know if you have received a fax that was suppose to be faxed over to you for an Orthopedics CT-Scan from Dr. Maradiaga's office in [place] please?

## 2022-04-18 NOTE — TELEPHONE ENCOUNTER
Patient message was sent to the wrong provider -- patient states she is waiting for a fax of a CT hand order to be faxed over from another doctors office so that she can get the imaging done at a specific location -- instructed patient to call the other doctors office back and see what happen with the CT order -- she may need to go and  the hard copy - patient thankful for call and verbalized understanding

## 2022-04-19 ENCOUNTER — HOSPITAL ENCOUNTER (OUTPATIENT)
Dept: RADIOLOGY | Facility: HOSPITAL | Age: 72
Discharge: HOME | End: 2022-04-19
Attending: SURGERY
Payer: MEDICARE

## 2022-04-19 DIAGNOSIS — S62.647D NONDISPLACED FRACTURE OF PROXIMAL PHALANX OF LEFT LITTLE FINGER, SUBSEQUENT ENCOUNTER FOR FRACTURE WITH ROUTINE HEALING: ICD-10-CM

## 2022-04-19 PROCEDURE — 73200 CT UPPER EXTREMITY W/O DYE: CPT | Mod: 26,LT,, | Performed by: RADIOLOGY

## 2022-04-19 PROCEDURE — 73200 CT UPPER EXTREMITY W/O DYE: CPT | Mod: TC,PO,LT

## 2022-04-19 PROCEDURE — 73200 CT HAND WITHOUT CONTRAST LEFT: ICD-10-PCS | Mod: 26,LT,, | Performed by: RADIOLOGY

## 2023-05-22 ENCOUNTER — PATIENT MESSAGE (OUTPATIENT)
Dept: RESEARCH | Facility: HOSPITAL | Age: 73
End: 2023-05-22
Payer: MEDICARE

## 2023-07-31 ENCOUNTER — HOSPITAL ENCOUNTER (OUTPATIENT)
Dept: RADIOLOGY | Facility: HOSPITAL | Age: 73
Discharge: HOME | End: 2023-07-31
Payer: MEDICARE

## 2023-07-31 DIAGNOSIS — M54.89 OTHER DORSALGIA: ICD-10-CM

## 2023-07-31 PROCEDURE — 72146 MRI THORACIC SPINE WITHOUT CONTRAST: ICD-10-PCS | Mod: 26,,, | Performed by: RADIOLOGY

## 2023-07-31 PROCEDURE — 72146 MRI CHEST SPINE W/O DYE: CPT | Mod: 26,,, | Performed by: RADIOLOGY

## 2023-07-31 PROCEDURE — 72146 MRI CHEST SPINE W/O DYE: CPT | Mod: TC,PN

## 2023-07-31 PROCEDURE — 72148 MRI LUMBAR SPINE W/O DYE: CPT | Mod: TC,PN

## 2023-07-31 PROCEDURE — 72148 MRI LUMBAR SPINE W/O DYE: CPT | Mod: 26,,, | Performed by: RADIOLOGY

## 2023-07-31 PROCEDURE — 72148 MRI LUMBAR SPINE WITHOUT CONTRAST: ICD-10-PCS | Mod: 26,,, | Performed by: RADIOLOGY

## 2023-10-17 ENCOUNTER — APPOINTMENT (RX ONLY)
Dept: URBAN - METROPOLITAN AREA CLINIC 51 | Facility: CLINIC | Age: 73
Setting detail: DERMATOLOGY
End: 2023-10-17

## 2023-10-17 DIAGNOSIS — L81.4 OTHER MELANIN HYPERPIGMENTATION: ICD-10-CM | Status: STABLE

## 2023-10-17 DIAGNOSIS — L91.8 OTHER HYPERTROPHIC DISORDERS OF THE SKIN: ICD-10-CM | Status: STABLE

## 2023-10-17 DIAGNOSIS — D18.0 HEMANGIOMA: ICD-10-CM | Status: STABLE

## 2023-10-17 DIAGNOSIS — L82.1 OTHER SEBORRHEIC KERATOSIS: ICD-10-CM | Status: STABLE

## 2023-10-17 DIAGNOSIS — D22 MELANOCYTIC NEVI: ICD-10-CM | Status: STABLE

## 2023-10-17 PROBLEM — D18.01 HEMANGIOMA OF SKIN AND SUBCUTANEOUS TISSUE: Status: ACTIVE | Noted: 2023-10-17

## 2023-10-17 PROBLEM — D22.5 MELANOCYTIC NEVI OF TRUNK: Status: ACTIVE | Noted: 2023-10-17

## 2023-10-17 PROCEDURE — ? COUNSELING

## 2023-10-17 PROCEDURE — 99203 OFFICE O/P NEW LOW 30 MIN: CPT

## 2023-10-17 PROCEDURE — ? SUNSCREEN RECOMMENDATIONS

## 2023-10-17 ASSESSMENT — LOCATION DETAILED DESCRIPTION DERM
LOCATION DETAILED: RIGHT RIB CAGE
LOCATION DETAILED: LEFT ANTERIOR PROXIMAL THIGH
LOCATION DETAILED: RIGHT ANTERIOR PROXIMAL THIGH
LOCATION DETAILED: LEFT SUPERIOR LATERAL MIDBACK
LOCATION DETAILED: RIGHT INFERIOR MEDIAL UPPER BACK
LOCATION DETAILED: MIDDLE STERNUM
LOCATION DETAILED: INFERIOR THORACIC SPINE
LOCATION DETAILED: RIGHT INFERIOR ANTERIOR NECK

## 2023-10-17 ASSESSMENT — LOCATION SIMPLE DESCRIPTION DERM
LOCATION SIMPLE: ABDOMEN
LOCATION SIMPLE: RIGHT UPPER BACK
LOCATION SIMPLE: RIGHT THIGH
LOCATION SIMPLE: UPPER BACK
LOCATION SIMPLE: LEFT LOWER BACK
LOCATION SIMPLE: RIGHT ANTERIOR NECK
LOCATION SIMPLE: CHEST
LOCATION SIMPLE: LEFT THIGH

## 2023-10-17 ASSESSMENT — LOCATION ZONE DERM
LOCATION ZONE: LEG
LOCATION ZONE: NECK
LOCATION ZONE: TRUNK

## 2023-10-17 NOTE — HPI: EVALUATION OF SKIN LESION(S)
Hpi Title: Evaluation of Skin Lesions
Additional History: Patient concerned of spots on chest, back, abdomen and both legs.

## 2024-12-30 ENCOUNTER — APPOINTMENT (OUTPATIENT)
Dept: URBAN - METROPOLITAN AREA CLINIC 51 | Facility: CLINIC | Age: 74
Setting detail: DERMATOLOGY
End: 2024-12-30

## 2024-12-30 DIAGNOSIS — L21.8 OTHER SEBORRHEIC DERMATITIS: ICD-10-CM

## 2024-12-30 DIAGNOSIS — L82.1 OTHER SEBORRHEIC KERATOSIS: ICD-10-CM | Status: STABLE

## 2024-12-30 DIAGNOSIS — L81.4 OTHER MELANIN HYPERPIGMENTATION: ICD-10-CM | Status: STABLE

## 2024-12-30 DIAGNOSIS — L30.4 ERYTHEMA INTERTRIGO: ICD-10-CM

## 2024-12-30 DIAGNOSIS — D22 MELANOCYTIC NEVI: ICD-10-CM | Status: STABLE

## 2024-12-30 DIAGNOSIS — Z71.89 OTHER SPECIFIED COUNSELING: ICD-10-CM

## 2024-12-30 PROBLEM — D22.5 MELANOCYTIC NEVI OF TRUNK: Status: ACTIVE | Noted: 2024-12-30

## 2024-12-30 PROBLEM — L30.9 DERMATITIS, UNSPECIFIED: Status: ACTIVE | Noted: 2024-12-30

## 2024-12-30 PROCEDURE — ? PRESCRIPTION MEDICATION MANAGEMENT

## 2024-12-30 PROCEDURE — ? PRESCRIPTION

## 2024-12-30 PROCEDURE — ? MEDICATION COUNSELING

## 2024-12-30 PROCEDURE — 99213 OFFICE O/P EST LOW 20 MIN: CPT

## 2024-12-30 PROCEDURE — ? COUNSELING

## 2024-12-30 PROCEDURE — ? TREATMENT REGIMEN

## 2024-12-30 PROCEDURE — ? SUNSCREEN RECOMMENDATIONS

## 2024-12-30 PROCEDURE — ? EDUCATIONAL RESOURCES PROVIDED

## 2024-12-30 PROCEDURE — ? ADDITIONAL NOTES

## 2024-12-30 PROCEDURE — ? FULL BODY SKIN EXAM

## 2024-12-30 RX ORDER — NYSTATIN AND TRIAMCINOLONE ACETONIDE 100000; 1 [USP'U]/G; MG/G
CREAM TOPICAL
Qty: 60 | Refills: 5 | COMMUNITY
Start: 2024-12-30

## 2024-12-30 RX ADMIN — NYSTATIN AND TRIAMCINOLONE ACETONIDE: 100000; 1 CREAM TOPICAL at 00:00

## 2024-12-30 ASSESSMENT — LOCATION ZONE DERM
LOCATION ZONE: TRUNK
LOCATION ZONE: FACE
LOCATION ZONE: AXILLAE
LOCATION ZONE: NOSE
LOCATION ZONE: ARM
LOCATION ZONE: LEG

## 2024-12-30 ASSESSMENT — LOCATION SIMPLE DESCRIPTION DERM
LOCATION SIMPLE: LEFT THIGH
LOCATION SIMPLE: RIGHT AXILLARY VAULT
LOCATION SIMPLE: CHEST
LOCATION SIMPLE: UPPER BACK
LOCATION SIMPLE: RIGHT THIGH
LOCATION SIMPLE: LEFT LOWER BACK
LOCATION SIMPLE: NOSE
LOCATION SIMPLE: LEFT UPPER ARM
LOCATION SIMPLE: RIGHT PRETIBIAL REGION
LOCATION SIMPLE: LEFT AXILLARY VAULT
LOCATION SIMPLE: RIGHT UPPER ARM
LOCATION SIMPLE: LEFT CHEEK

## 2024-12-30 ASSESSMENT — LOCATION DETAILED DESCRIPTION DERM
LOCATION DETAILED: LEFT ANTERIOR DISTAL THIGH
LOCATION DETAILED: LEFT INFERIOR MEDIAL MIDBACK
LOCATION DETAILED: NASAL DORSUM
LOCATION DETAILED: LEFT ANTERIOR DISTAL UPPER ARM
LOCATION DETAILED: RIGHT MEDIAL SUPERIOR CHEST
LOCATION DETAILED: LEFT INFERIOR CENTRAL MALAR CHEEK
LOCATION DETAILED: RIGHT AXILLARY VAULT
LOCATION DETAILED: RIGHT LATERAL DISTAL PRETIBIAL REGION
LOCATION DETAILED: LEFT AXILLARY VAULT
LOCATION DETAILED: RIGHT ANTERIOR PROXIMAL THIGH
LOCATION DETAILED: SUPERIOR THORACIC SPINE
LOCATION DETAILED: RIGHT ANTERIOR DISTAL UPPER ARM

## 2024-12-30 NOTE — PROCEDURE: PRESCRIPTION MEDICATION MANAGEMENT
Detail Level: Zone
Initiate Treatment: nystatin-triamcinolone 100,000 unit/g-0.1 % topical cream \\nQuantity: 60.0 g  Days Supply: 30\\nSig: Apply to nose and armpits QD-BID X 2 weeks then PRN
Render In Strict Bullet Format?: No

## 2024-12-30 NOTE — HPI: EVALUATION OF SKIN LESION(S)
What Type Of Note Output Would You Prefer (Optional)?: Standard Output
Hpi Title: Evaluation of Skin Lesions
How Severe Are Your Spot(S)?: mild
Have Your Spot(S) Been Treated In The Past?: has not been treated
Additional History: Pt has concerns on her right leg

## 2024-12-30 NOTE — PROCEDURE: MEDICATION COUNSELING
Zepbound Pregnancy And Lactation Text: The fetal risk of this medication is unknown and taking while pregnant is not recommended. It is unknown if this medication is present in breast milk.
Doxepin Counseling:  Patient advised that the medication is sedating and not to drive a car after taking this medication. Patient informed of potential adverse effects including but not limited to dry mouth, urinary retention, and blurry vision.  The patient verbalized understanding of the proper use and possible adverse effects of doxepin.  All of the patient's questions and concerns were addressed.
Odomzo Counseling- I discussed with the patient the risks of Odomzo including but not limited to nausea, vomiting, diarrhea, constipation, weight loss, changes in the sense of taste, decreased appetite, muscle spasms, and hair loss.  The patient verbalized understanding of the proper use and possible adverse effects of Odomzo.  All of the patient's questions and concerns were addressed.
Griseofulvin Pregnancy And Lactation Text: This medication is Pregnancy Category X and is known to cause serious birth defects. It is unknown if this medication is excreted in breast milk but breast feeding should be avoided.
Sotyktu Pregnancy And Lactation Text: There is insufficient data to evaluate whether or not Sotyktu is safe to use during pregnancy.? ?It is not known if Sotyktu passes into breast milk and whether or not it is safe to use when breastfeeding.??
Azithromycin Counseling:  I discussed with the patient the risks of azithromycin including but not limited to GI upset, allergic reaction, drug rash, diarrhea, and yeast infections.
Infliximab Pregnancy And Lactation Text: This medication is Pregnancy Category B and is considered safe during pregnancy. It is unknown if this medication is excreted in breast milk.
Birth Control Pills Pregnancy And Lactation Text: This medication should be avoided if pregnant and for the first 30 days post-partum.
Zyclara Counseling:  I discussed with the patient the risks of imiquimod including but not limited to erythema, scaling, itching, weeping, crusting, and pain.  Patient understands that the inflammatory response to imiquimod is variable from person to person and was educated regarded proper titration schedule.  If flu-like symptoms develop, patient knows to discontinue the medication and contact us.
Tremfya Counseling: I discussed with the patient the risks of guselkumab including but not limited to immunosuppression, serious infections, worsening of inflammatory bowel disease and drug reactions.  The patient understands that monitoring is required including a PPD at baseline and must alert us or the primary physician if symptoms of infection or other concerning signs are noted.
Xeljanz Counseling: I discussed with the patient the risks of Xeljanz therapy including increased risk of infection, liver issues, headache, diarrhea, or cold symptoms. Live vaccines should be avoided. They were instructed to call if they have any problems.
Itraconazole Counseling:  I discussed with the patient the risks of itraconazole including but not limited to liver damage, nausea/vomiting, neuropathy, and severe allergy.  The patient understands that this medication is best absorbed when taken with acidic beverages such as non-diet cola or ginger ale.  The patient understands that monitoring is required including baseline LFTs and repeat LFTs at intervals.  The patient understands that they are to contact us or the primary physician if concerning signs are noted.
Azithromycin Pregnancy And Lactation Text: This medication is considered safe during pregnancy and is also secreted in breast milk.
Eucrisa Counseling: Patient may experience a mild burning sensation during topical application. Eucrisa is not approved in children less than 2 years of age.
Odomzo Pregnancy And Lactation Text: This medication is Pregnancy Category X and is absolutely contraindicated during pregnancy. It is unknown if it is excreted in breast milk.
Cyclosporine Counseling:  I discussed with the patient the risks of cyclosporine including but not limited to hypertension, gingival hyperplasia,myelosuppression, immunosuppression, liver damage, kidney damage, neurotoxicity, lymphoma, and serious infections. The patient understands that monitoring is required including baseline blood pressure, CBC, CMP, lipid panel and uric acid, and then 1-2 times monthly CMP and blood pressure.
Rifampin Pregnancy And Lactation Text: This medication is Pregnancy Category C and it isn't know if it is safe during pregnancy. It is also excreted in breast milk and should not be used if you are breast feeding.
Ebglyss Counseling: I discussed with the patient the risks of lebrikizumab including but not limited to eye inflammation and irritation, cold sores, injection site reactions, allergic reactions and increased risk of parasitic infection. The patient understands that monitoring is required and they must alert us or the primary physician if symptoms of infection or other concerning signs are noted.
Topical Clindamycin Counseling: Patient counseled that this medication may cause skin irritation or allergic reactions.  In the event of skin irritation, the patient was advised to reduce the amount of the drug applied or use it less frequently.   The patient verbalized understanding of the proper use and possible adverse effects of clindamycin.  All of the patient's questions and concerns were addressed.
Low Dose Naltrexone Counseling- I discussed with the patient the potential risks and side effects of low dose naltrexone including but not limited to: more vivid dreams, headaches, nausea, vomiting, abdominal pain, fatigue, dizziness, and anxiety.
Spironolactone Counseling: Patient advised regarding risks of diarrhea, abdominal pain, hyperkalemia, birth defects (for female patients), liver toxicity and renal toxicity. The patient may need blood work to monitor liver and kidney function and potassium levels while on therapy. The patient verbalized understanding of the proper use and possible adverse effects of spironolactone.  All of the patient's questions and concerns were addressed.
Nemluvio Counseling: I discussed with the patient the risks of nemolizumab including but not limited to headache, gastrointestinal complaints, nasopharyngitis, musculoskeletal complaints, injection site reactions, and allergic reactions. The patient understands that monitoring is required and they must alert us or the primary physician if any side effects are noted.
Cyclosporine Pregnancy And Lactation Text: This medication is Pregnancy Category C and it isn't know if it is safe during pregnancy. This medication is excreted in breast milk.
Sarecycline Counseling: Patient advised regarding possible photosensitivity and discoloration of the teeth, skin, lips, tongue and gums.  Patient instructed to avoid sunlight, if possible.  When exposed to sunlight, patients should wear protective clothing, sunglasses, and sunscreen.  The patient was instructed to call the office immediately if the following severe adverse effects occur:  hearing changes, easy bruising/bleeding, severe headache, or vision changes.  The patient verbalized understanding of the proper use and possible adverse effects of sarecycline.  All of the patient's questions and concerns were addressed.
Tremfya Pregnancy And Lactation Text: The risk during pregnancy and breastfeeding is uncertain with this medication.
Doxepin Pregnancy And Lactation Text: This medication is Pregnancy Category C and it isn't known if it is safe during pregnancy. It is also excreted in breast milk and breast feeding isn't recommended.
Zyclara Pregnancy And Lactation Text: This medication is Pregnancy Category C. It is unknown if this medication is excreted in breast milk.
Eucrisa Pregnancy And Lactation Text: This medication has not been assigned a Pregnancy Risk Category but animal studies failed to show danger with the topical medication. It is unknown if the medication is excreted in breast milk.
Topical Clindamycin Pregnancy And Lactation Text: This medication is Pregnancy Category B and is considered safe during pregnancy. It is unknown if it is excreted in breast milk.
Hydroxyzine Counseling: Patient advised that the medication is sedating and not to drive a car after taking this medication.  Patient informed of potential adverse effects including but not limited to dry mouth, urinary retention, and blurry vision.  The patient verbalized understanding of the proper use and possible adverse effects of hydroxyzine.  All of the patient's questions and concerns were addressed.
Bactrim Counseling:  I discussed with the patient the risks of sulfa antibiotics including but not limited to GI upset, allergic reaction, drug rash, diarrhea, dizziness, photosensitivity, and yeast infections.  Rarely, more serious reactions can occur including but not limited to aplastic anemia, agranulocytosis, methemoglobinemia, blood dyscrasias, liver or kidney failure, lung infiltrates or desquamative/blistering drug rashes.
Spironolactone Pregnancy And Lactation Text: This medication can cause feminization of the male fetus and should be avoided during pregnancy. The active metabolite is also found in breast milk.
Nemluvio Pregnancy And Lactation Text: It is not known if Nemluvio causes fetal harm or is present in breast milk. Please proceed with caution if patients who are pregnant or breastfeeding.
Ebglyss Pregnancy And Lactation Text: This medication likely crosses the placenta but the risk for the fetus is uncertain. It is unknown if this medication is excreted in breast milk.
Low Dose Naltrexone Pregnancy And Lactation Text: Naltrexone is pregnancy category C.  There have been no adequate and well-controlled studies in pregnant women.  It should be used in pregnancy only if the potential benefit justifies the potential risk to the fetus.   Limited data indicates that naltrexone is minimally excreted into breastmilk.
Xeljudz Pregnancy And Lactation Text: This medication is Pregnancy Category D and is not considered safe during pregnancy.  The risk during breast feeding is also uncertain.
Itraconazole Pregnancy And Lactation Text: This medication is Pregnancy Category C and it isn't know if it is safe during pregnancy. It is also excreted in breast milk.
Methotrexate Counseling:  Patient counseled regarding adverse effects of methotrexate including but not limited to nausea, vomiting, abnormalities in liver function tests. Patients may develop mouth sores, rash, diarrhea, and abnormalities in blood counts. The patient understands that monitoring is required including LFT's and blood counts.  There is a rare possibility of scarring of the liver and lung problems that can occur when taking methotrexate. Persistent nausea, loss of appetite, pale stools, dark urine, cough, and shortness of breath should be reported immediately. Patient advised to discontinue methotrexate treatment at least three months before attempting to become pregnant.  I discussed the need for folate supplements while taking methotrexate.  These supplements can decrease side effects during methotrexate treatment. The patient verbalized understanding of the proper use and possible adverse effects of methotrexate.  All of the patient's questions and concerns were addressed.
Sarecycline Pregnancy And Lactation Text: This medication is Pregnancy Category D and not consider safe during pregnancy. It is also excreted in breast milk.
Topical Ketoconazole Counseling: Patient counseled that this medication may cause skin irritation or allergic reactions.  In the event of skin irritation, the patient was advised to reduce the amount of the drug applied or use it less frequently.   The patient verbalized understanding of the proper use and possible adverse effects of ketoconazole.  All of the patient's questions and concerns were addressed.
Aklief counseling:  Patient advised to apply a pea-sized amount only at bedtime and wait 30 minutes after washing their face before applying.  If too drying, patient may add a non-comedogenic moisturizer.  The most commonly reported side effects including irritation, redness, scaling, dryness, stinging, burning, itching, and increased risk of sunburn.  The patient verbalized understanding of the proper use and possible adverse effects of retinoids.  All of the patient's questions and concerns were addressed.
Hydroxyzine Pregnancy And Lactation Text: This medication is not safe during pregnancy and should not be taken. It is also excreted in breast milk and breast feeding isn't recommended.
Bactrim Pregnancy And Lactation Text: This medication is Pregnancy Category D and is known to cause fetal risk.  It is also excreted in breast milk.
Niacinamide Counseling: I recommended taking niacin or niacinamide, also know as vitamin B3, twice daily. Recent evidence suggests that taking vitamin B3 (500 mg twice daily) can reduce the risk of actinic keratoses and non-melanoma skin cancers. Side effects of vitamin B3 include flushing and headache.
Aklief Pregnancy And Lactation Text: It is unknown if this medication is safe to use during pregnancy.  It is unknown if this medication is excreted in breast milk.  Breastfeeding women should use the topical cream on the smallest area of the skin for the shortest time needed while breastfeeding.  Do not apply to nipple and areola.
Xolair Counseling:  Patient informed of potential adverse effects including but not limited to fever, muscle aches, rash and allergic reactions.  The patient verbalized understanding of the proper use and possible adverse effects of Xolair.  All of the patient's questions and concerns were addressed.
Ketoconazole Counseling:   Patient counseled regarding improving absorption with orange juice.  Adverse effects include but are not limited to breast enlargement, headache, diarrhea, nausea, upset stomach, liver function test abnormalities, taste disturbance, and stomach pain.  There is a rare possibility of liver failure that can occur when taking ketoconazole. The patient understands that monitoring of LFTs may be required, especially at baseline. The patient verbalized understanding of the proper use and possible adverse effects of ketoconazole.  All of the patient's questions and concerns were addressed.
Rituxan Counseling:  I discussed with the patient the risks of Rituxan infusions. Side effects can include infusion reactions, severe drug rashes including mucocutaneous reactions, reactivation of latent hepatitis and other infections and rarely progressive multifocal leukoencephalopathy.  All of the patient's questions and concerns were addressed.
Hydroquinone Counseling:  Patient advised that medication may result in skin irritation, lightening (hypopigmentation), dryness, and burning.  In the event of skin irritation, the patient was advised to reduce the amount of the drug applied or use it less frequently.  Rarely, spots that are treated with hydroquinone can become darker (pseudoochronosis).  Should this occur, patient instructed to stop medication and call the office. The patient verbalized understanding of the proper use and possible adverse effects of hydroquinone.  All of the patient's questions and concerns were addressed.
Tetracycline Counseling: Patient counseled regarding possible photosensitivity and increased risk for sunburn.  Patient instructed to avoid sunlight, if possible.  When exposed to sunlight, patients should wear protective clothing, sunglasses, and sunscreen.  The patient was instructed to call the office immediately if the following severe adverse effects occur:  hearing changes, easy bruising/bleeding, severe headache, or vision changes.  The patient verbalized understanding of the proper use and possible adverse effects of tetracycline.  All of the patient's questions and concerns were addressed. Patient understands to avoid pregnancy while on therapy due to potential birth defects.
Ketoconazole Pregnancy And Lactation Text: This medication is Pregnancy Category C and it isn't know if it is safe during pregnancy. It is also excreted in breast milk and breast feeding isn't recommended.
Niacinamide Pregnancy And Lactation Text: These medications are considered safe during pregnancy.
Methotrexate Pregnancy And Lactation Text: This medication is Pregnancy Category X and is known to cause fetal harm. This medication is excreted in breast milk.
Xolair Pregnancy And Lactation Text: This medication is Pregnancy Category B and is considered safe during pregnancy. This medication is excreted in breast milk.
Cantharidin Pregnancy And Lactation Text: This medication has not been proven safe during pregnancy. It is unknown if this medication is excreted in breast milk.
Azelaic Acid Counseling: Patient counseled that medicine may cause skin irritation and to avoid applying near the eyes.  In the event of skin irritation, the patient was advised to reduce the amount of the drug applied or use it less frequently.   The patient verbalized understanding of the proper use and possible adverse effects of azelaic acid.  All of the patient's questions and concerns were addressed.
Rituxan Pregnancy And Lactation Text: This medication is Pregnancy Category C and it isn't know if it is safe during pregnancy. It is unknown if this medication is excreted in breast milk but similar antibodies are known to be excreted.
Cephalexin Counseling: I counseled the patient regarding use of cephalexin as an antibiotic for prophylactic and/or therapeutic purposes. Cephalexin (commonly prescribed under brand name Keflex) is a cephalosporin antibiotic which is active against numerous classes of bacteria, including most skin bacteria. Side effects may include nausea, diarrhea, gastrointestinal upset, rash, hives, yeast infections, and in rare cases, hepatitis, kidney disease, seizures, fever, confusion, neurologic symptoms, and others. Patients with severe allergies to penicillin medications are cautioned that there is about a 10% incidence of cross-reactivity with cephalosporins. When possible, patients with penicillin allergies should use alternatives to cephalosporins for antibiotic therapy.
Cimzia Pregnancy And Lactation Text: This medication crosses the placenta but can be considered safe in certain situations. Cimzia may be excreted in breast milk.
Minocycline Counseling: Patient advised regarding possible photosensitivity and discoloration of the teeth, skin, lips, tongue and gums.  Patient instructed to avoid sunlight, if possible.  When exposed to sunlight, patients should wear protective clothing, sunglasses, and sunscreen.  The patient was instructed to call the office immediately if the following severe adverse effects occur:  hearing changes, easy bruising/bleeding, severe headache, or vision changes.  The patient verbalized understanding of the proper use and possible adverse effects of minocycline.  All of the patient's questions and concerns were addressed.
5-Fu Pregnancy And Lactation Text: This medication is Pregnancy Category X and contraindicated in pregnancy and in women who may become pregnant. It is unknown if this medication is excreted in breast milk.
Dutasteride Pregnancy And Lactation Text: This medication is absolutely contraindicated in women, especially during pregnancy and breast feeding. Feminization of male fetuses is possible if taking while pregnant.
Albendazole Counseling:  I discussed with the patient the risks of albendazole including but not limited to cytopenia, kidney damage, nausea/vomiting and severe allergy.  The patient understands that this medication is being used in an off-label manner.
Soolantra Pregnancy And Lactation Text: This medication is Pregnancy Category C. This medication is considered safe during breast feeding.
Gabapentin Pregnancy And Lactation Text: This medication is Pregnancy Category C and isn't considered safe during pregnancy. It is excreted in breast milk.
Spevigo Pregnancy And Lactation Text: The risk during pregnancy and breastfeeding is uncertain with this medication. This medication does cross the placenta. It is unknown if this medication is found in breast milk.
Opzelura Pregnancy And Lactation Text: There is insufficient data to evaluate drug-associated risk for major birth defects, miscarriage, or other adverse maternal or fetal outcomes.  There is a pregnancy registry that monitors pregnancy outcomes in pregnant persons exposed to the medication during pregnancy.  It is unknown if this medication is excreted in breast milk.  Do not breastfeed during treatment and for about 4 weeks after the last dose.
Tranexamic Acid Counseling:  Patient advised of the small risk of bleeding problems with tranexamic acid. They were also instructed to call if they developed any nausea, vomiting or diarrhea. All of the patient's questions and concerns were addressed.
Azathioprine Pregnancy And Lactation Text: This medication is Pregnancy Category D and isn't considered safe during pregnancy. It is unknown if this medication is excreted in breast milk.
Erivedge Counseling- I discussed with the patient the risks of Erivedge including but not limited to nausea, vomiting, diarrhea, constipation, weight loss, changes in the sense of taste, decreased appetite, muscle spasms, and hair loss.  The patient verbalized understanding of the proper use and possible adverse effects of Erivedge.  All of the patient's questions and concerns were addressed.
Picato Counseling:  I discussed with the patient the risks of Picato including but not limited to erythema, scaling, itching, weeping, crusting, and pain.
VTAMA Counseling: I discussed with the patient that VTAMA is not for use in the eyes, mouth or mouth. They should call the office if they develop any signs of allergic reactions to VTAMA. The patient verbalized understanding of the proper use and possible adverse effects of VTAMA.  All of the patient's questions and concerns were addressed.
Olumiant Pregnancy And Lactation Text: Based on animal studies, Olumiant may cause embryo-fetal harm when administered to pregnant women.  The medication should not be used in pregnancy.  Breastfeeding is not recommended during treatment.
Detail Level: Simple
Finasteride Male Counseling: Finasteride Counseling:  I discussed with the patient the risks of use of finasteride including but not limited to decreased libido, decreased ejaculate volume, gynecomastia, and depression. Women should not handle medication.  All of the patient's questions and concerns were addressed.
Isotretinoin Counseling: Patient should get monthly blood tests, not donate blood, not drive at night if vision affected, not share medication, and not undergo elective surgery for 6 months after tx completed. Side effects reviewed, pt to contact office should one occur.
Fluconazole Counseling:  Patient counseled regarding adverse effects of fluconazole including but not limited to headache, diarrhea, nausea, upset stomach, liver function test abnormalities, taste disturbance, and stomach pain.  There is a rare possibility of liver failure that can occur when taking fluconazole.  The patient understands that monitoring of LFTs and kidney function test may be required, especially at baseline. The patient verbalized understanding of the proper use and possible adverse effects of fluconazole.  All of the patient's questions and concerns were addressed.
Cellcept Counseling:  I discussed with the patient the risks of mycophenolate mofetil including but not limited to infection/immunosuppression, GI upset, hypokalemia, hypercholesterolemia, bone marrow suppression, lymphoproliferative disorders, malignancy, GI ulceration/bleed/perforation, colitis, interstitial lung disease, kidney failure, progressive multifocal leukoencephalopathy, and birth defects.  The patient understands that monitoring is required including a baseline creatinine and regular CBC testing. In addition, patient must alert us immediately if symptoms of infection or other concerning signs are noted.
Rinvoq Counseling: I discussed with the patient the risks of Rinvoq therapy including but not limited to upper respiratory tract infections, shingles, cold sores, bronchitis, nausea, cough, fever, acne, and headache. Live vaccines should be avoided.  This medication has been linked to serious infections; higher rate of mortality; malignancy and lymphoproliferative disorders; major adverse cardiovascular events; thrombosis; thrombocytopenia, anemia, and neutropenia; lipid elevations; liver enzyme elevations; and gastrointestinal perforations.
Include Pregnancy/Lactation Warning?: No
Drysol Counseling:  I discussed with the patient the risks of drysol/aluminum chloride including but not limited to skin rash, itching, irritation, burning.
Glycopyrrolate Counseling:  I discussed with the patient the risks of glycopyrrolate including but not limited to skin rash, drowsiness, dry mouth, difficulty urinating, and blurred vision.
Tranexamic Acid Pregnancy And Lactation Text: It is unknown if this medication is safe during pregnancy or breast feeding.
Albendazole Pregnancy And Lactation Text: This medication is Pregnancy Category C and it isn't known if it is safe during pregnancy. It is also excreted in breast milk.
Cosentyx Counseling:  I discussed with the patient the risks of Cosentyx including but not limited to worsening of Crohn's disease, immunosuppression, allergic reactions and infections.  The patient understands that monitoring is required including a PPD at baseline and must alert us or the primary physician if symptoms of infection or other concerning signs are noted.
Stelara Counseling:  I discussed with the patient the risks of ustekinumab including but not limited to immunosuppression, malignancy, posterior leukoencephalopathy syndrome, and serious infections.  The patient understands that monitoring is required including a PPD at baseline and must alert us or the primary physician if symptoms of infection or other concerning signs are noted.
Topical Retinoid counseling:  Patient advised to apply a pea-sized amount only at bedtime and wait 30 minutes after washing their face before applying.  If too drying, patient may add a non-comedogenic moisturizer. The patient verbalized understanding of the proper use and possible adverse effects of retinoids.  All of the patient's questions and concerns were addressed.
Wegovy Counseling: I reviewed the possible side effects including: thyroid tumors, kidney disease, gallbladder disease, abdominal pain, constipation, diarrhea, nausea, vomiting and pancreatitis. Do not take this medication if you have a history or family history of multiple endocrine neoplasia syndrome type 2. Side effects reviewed, pt to contact office should one occur.
Ilumya Counseling: I discussed with the patient the risks of tildrakizumab including but not limited to immunosuppression, malignancy, posterior leukoencephalopathy syndrome, and serious infections.  The patient understands that monitoring is required including a PPD at baseline and must alert us or the primary physician if symptoms of infection or other concerning signs are noted.
Finasteride Female Counseling: Finasteride Counseling:  I discussed with the patient the risks of use of finasteride including but not limited to decreased libido and sexual dysfunction. Explained the teratogenic nature of the medication and stressed the importance of not getting pregnant during treatment. All of the patient's questions and concerns were addressed.
Ivermectin Counseling:  Patient instructed to take medication on an empty stomach with a full glass of water.  Patient informed of potential adverse effects including but not limited to nausea, diarrhea, dizziness, itching, and swelling of the extremities or lymph nodes.  The patient verbalized understanding of the proper use and possible adverse effects of ivermectin.  All of the patient's questions and concerns were addressed.
Quinolones Counseling:  I discussed with the patient the risks of fluoroquinolones including but not limited to GI upset, allergic reaction, drug rash, diarrhea, dizziness, photosensitivity, yeast infections, liver function test abnormalities, tendonitis/tendon rupture.
Isotretinoin Pregnancy And Lactation Text: This medication is Pregnancy Category X and is considered extremely dangerous during pregnancy. It is unknown if it is excreted in breast milk.
Vtama Pregnancy And Lactation Text: It is unknown if this medication can cause problems during pregnancy and breastfeeding.
Drysol Pregnancy And Lactation Text: This medication is considered safe during pregnancy and breast feeding.
High Dose Vitamin A Counseling: Side effects reviewed, pt to contact office should one occur.
Rinvoq Pregnancy And Lactation Text: Based on animal studies, Rinvoq may cause embryo-fetal harm when administered to pregnant women.  The medication should not be used in pregnancy.  Breastfeeding is not recommended during treatment and for 6 days after the last dose.
Cimetidine Counseling:  I discussed with the patient the risks of Cimetidine including but not limited to gynecomastia, headache, diarrhea, nausea, drowsiness, arrhythmias, pancreatitis, skin rashes, psychosis, bone marrow suppression and kidney toxicity.
Finasteride Pregnancy And Lactation Text: This medication is absolutely contraindicated during pregnancy. It is unknown if it is excreted in breast milk.
Libtayo Counseling- I discussed with the patient the risks of Libtayo including but not limited to nausea, vomiting, diarrhea, and bone or muscle pain.  The patient verbalized understanding of the proper use and possible adverse effects of Libtayo.  All of the patient's questions and concerns were addressed.
Valtrex Counseling: I discussed with the patient the risks of valacyclovir including but not limited to kidney damage, nausea, vomiting and severe allergy.  The patient understands that if the infection seems to be worsening or is not improving, they are to call.
Cibinqo Counseling: I discussed with the patient the risks of Cibinqo therapy including but not limited to common cold, nausea, headache, cold sores, increased blood CPK levels, dizziness, UTIs, fatigue, acne, and vomitting. Live vaccines should be avoided.  This medication has been linked to serious infections; higher rate of mortality; malignancy and lymphoproliferative disorders; major adverse cardiovascular events; thrombosis; thrombocytopenia and lymphopenia; lipid elevations; and retinal detachment.
Glycopyrrolate Pregnancy And Lactation Text: This medication is Pregnancy Category B and is considered safe during pregnancy. It is unknown if it is excreted breast milk.
Cyclophosphamide Counseling:  I discussed with the patient the risks of cyclophosphamide including but not limited to hair loss, hormonal abnormalities, decreased fertility, abdominal pain, diarrhea, nausea and vomiting, bone marrow suppression and infection. The patient understands that monitoring is required while taking this medication.
Zoryve Counseling:  I discussed with the patient that Zoryve is not for use in the eyes, mouth or vagina. The most commonly reported side effects include diarrhea, headache, insomnia, application site pain, upper respiratory tract infections, and urinary tract infections.  All of the patient's questions and concerns were addressed.
Valtrex Pregnancy And Lactation Text: this medication is Pregnancy Category B and is considered safe during pregnancy. This medication is not directly found in breast milk but it's metabolite acyclovir is present.
Dupixent Counseling: I discussed with the patient the risks of dupilumab including but not limited to eye infection and irritation, cold sores, injection site reactions, worsening of asthma, allergic reactions and increased risk of parasitic infection.  Live vaccines should be avoided while taking dupilumab. Dupilumab will also interact with certain medications such as warfarin and cyclosporine. The patient understands that monitoring is required and they must alert us or the primary physician if symptoms of infection or other concerning signs are noted.
Taltz Counseling: I discussed with the patient the risks of ixekizumab including but not limited to immunosuppression, serious infections, worsening of inflammatory bowel disease and drug reactions.  The patient understands that monitoring is required including a PPD at baseline and must alert us or the primary physician if symptoms of infection or other concerning signs are noted.
Protopic Counseling: Patient may experience a mild burning sensation during topical application. Protopic is not approved in children less than 2 years of age. There have been case reports of hematologic and skin malignancies in patients using topical calcineurin inhibitors although causality is questionable.
Sotyktu Counseling:  I discussed the most common side effects of Sotyktu including: common cold, sore throat, sinus infections, cold sores, canker sores, folliculitis, and acne.? I also discussed more serious side effects of Sotyktu including but not limited to: serious allergic reactions; increased risk for infections such as TB; cancers such as lymphomas; rhabdomyolysis and elevated CPK; and elevated triglycerides and liver enzymes.?
Cimetidine Pregnancy And Lactation Text: This medication is Pregnancy Category B and is considered safe during pregnancy. It is also excreted in breast milk and breast feeding isn't recommended.
Tazorac Counseling:  Patient advised that medication is irritating and drying.  Patient may need to apply sparingly and wash off after an hour before eventually leaving it on overnight.  The patient verbalized understanding of the proper use and possible adverse effects of tazorac.  All of the patient's questions and concerns were addressed.
Hydroxychloroquine Counseling:  I discussed with the patient that a baseline ophthalmologic exam is needed at the start of therapy and every year thereafter while on therapy. A CBC may also be warranted for monitoring.  The side effects of this medication were discussed with the patient, including but not limited to agranulocytosis, aplastic anemia, seizures, rashes, retinopathy, and liver toxicity. Patient instructed to call the office should any adverse effect occur.  The patient verbalized understanding of the proper use and possible adverse effects of Plaquenil.  All the patient's questions and concerns were addressed.
Zepbound Counseling: I reviewed the possible side effects including: thyroid tumors, kidney disease, gallbladder disease, abdominal pain, constipation, diarrhea, nausea, vomiting and pancreatitis. Do not take this medication if you have a history or family history of multiple endocrine neoplasia syndrome type 2. Side effects reviewed, pt to contact office should one occur.
Griseofulvin Counseling:  I discussed with the patient the risks of griseofulvin including but not limited to photosensitivity, cytopenia, liver damage, nausea/vomiting and severe allergy.  The patient understands that this medication is best absorbed when taken with a fatty meal (e.g., ice cream or french fries).
Cibinqo Pregnancy And Lactation Text: It is unknown if this medication will adversely affect pregnancy or breast feeding.  You should not take this medication if you are currently pregnant or planning a pregnancy or while breastfeeding.
Elidel Counseling: Patient may experience a mild burning sensation during topical application. Elidel is not approved in children less than 2 years of age. There have been case reports of hematologic and skin malignancies in patients using topical calcineurin inhibitors although causality is questionable.
High Dose Vitamin A Pregnancy And Lactation Text: High dose vitamin A therapy is contraindicated during pregnancy and breast feeding.
Infliximab Counseling:  I discussed with the patient the risks of infliximab including but not limited to myelosuppression, immunosuppression, autoimmune hepatitis, demyelinating diseases, lymphoma, and serious infections.  The patient understands that monitoring is required including a PPD at baseline and must alert us or the primary physician if symptoms of infection or other concerning signs are noted.
Libtayo Pregnancy And Lactation Text: This medication is contraindicated in pregnancy and when breast feeding.
Dupixent Pregnancy And Lactation Text: This medication likely crosses the placenta but the risk for the fetus is uncertain. This medication is excreted in breast milk.
Cyclophosphamide Pregnancy And Lactation Text: This medication is Pregnancy Category D and it isn't considered safe during pregnancy. This medication is excreted in breast milk.
Rifampin Counseling: I discussed with the patient the risks of rifampin including but not limited to liver damage, kidney damage, red-orange body fluids, nausea/vomiting and severe allergy.
Birth Control Pills Counseling: Birth Control Pill Counseling: I discussed with the patient the potential side effects of OCPs including but not limited to increased risk of stroke, heart attack, thrombophlebitis, deep venous thrombosis, hepatic adenomas, breast changes, GI upset, headaches, and depression.  The patient verbalized understanding of the proper use and possible adverse effects of OCPs. All of the patient's questions and concerns were addressed.
Hydroxychloroquine Pregnancy And Lactation Text: This medication has been shown to cause fetal harm but it isn't assigned a Pregnancy Risk Category. There are small amounts excreted in breast milk.
Tazorac Pregnancy And Lactation Text: This medication is not safe during pregnancy. It is unknown if this medication is excreted in breast milk.
Colchicine Counseling:  Patient counseled regarding adverse effects including but not limited to stomach upset (nausea, vomiting, stomach pain, or diarrhea).  Patient instructed to limit alcohol consumption while taking this medication.  Colchicine may reduce blood counts especially with prolonged use.  The patient understands that monitoring of kidney function and blood counts may be required, especially at baseline. The patient verbalized understanding of the proper use and possible adverse effects of colchicine.  All of the patient's questions and concerns were addressed.
Rhofade Counseling: Rhofade is a topical medication which can decrease superficial blood flow where applied. Side effects are uncommon and include stinging, redness and allergic reactions.
Ozempic Counseling: I reviewed the possible side effects including: thyroid tumors, kidney disease, gallbladder disease, abdominal pain, constipation, diarrhea, nausea, vomiting and pancreatitis. Do not take this medication if you have a history or family history of multiple endocrine neoplasia syndrome type 2. Side effects reviewed, pt to contact office should one occur.
Enbrel Counseling:  I discussed with the patient the risks of etanercept including but not limited to myelosuppression, immunosuppression, autoimmune hepatitis, demyelinating diseases, lymphoma, and infections.  The patient understands that monitoring is required including a PPD at baseline and must alert us or the primary physician if symptoms of infection or other concerning signs are noted.
Erythromycin Counseling:  I discussed with the patient the risks of erythromycin including but not limited to GI upset, allergic reaction, drug rash, diarrhea, increase in liver enzymes, and yeast infections.
Topical Sulfur Applications Pregnancy And Lactation Text: This medication is Pregnancy Category C and has an unknown safety profile during pregnancy. It is unknown if this topical medication is excreted in breast milk.
Adbry Pregnancy And Lactation Text: It is unknown if this medication will adversely affect pregnancy or breast feeding.
Rhofade Pregnancy And Lactation Text: This medication has not been assigned a Pregnancy Risk Category. It is unknown if the medication is excreted in breast milk.
Oral Minoxidil Pregnancy And Lactation Text: This medication should only be used when clearly needed if you are pregnant, attempting to become pregnant or breast feeding.
SSKI Counseling:  I discussed with the patient the risks of SSKI including but not limited to thyroid abnormalities, metallic taste, GI upset, fever, headache, acne, arthralgias, paraesthesias, lymphadenopathy, easy bleeding, arrhythmias, and allergic reaction.
Otezla Counseling: The side effects of Otezla were discussed with the patient, including but not limited to worsening or new depression, weight loss, diarrhea, nausea, upper respiratory tract infection, and headache. Patient instructed to call the office should any adverse effect occur.  The patient verbalized understanding of the proper use and possible adverse effects of Otezla.  All the patient's questions and concerns were addressed.
Mirvaso Counseling: Mirvaso is a topical medication which can decrease superficial blood flow where applied. Side effects are uncommon and include stinging, redness and allergic reactions.
Wartpeel Counseling:  I discussed with the patient the risks of Wartpeel including but not limited to erythema, scaling, itching, weeping, crusting, and pain.
Calcipotriene Counseling:  I discussed with the patient the risks of calcipotriene including but not limited to erythema, scaling, itching, and irritation.
Acitretin Counseling:  I discussed with the patient the risks of acitretin including but not limited to hair loss, dry lips/skin/eyes, liver damage, hyperlipidemia, depression/suicidal ideation, photosensitivity.  Serious rare side effects can include but are not limited to pancreatitis, pseudotumor cerebri, bony changes, clot formation/stroke/heart attack.  Patient understands that alcohol is contraindicated since it can result in liver toxicity and significantly prolong the elimination of the drug by many years.
Bimzelx Counseling:  I discussed with the patient the risks of Bimzelx including but not limited to depression, immunosuppression, allergic reactions and infections.  The patient understands that monitoring is required including a PPD at baseline and must alert us or the primary physician if symptoms of infection or other concerning signs are noted.
Calcipotriene Pregnancy And Lactation Text: The use of this medication during pregnancy or lactation is not recommended as there is insufficient data.
Dapsone Counseling: I discussed with the patient the risks of dapsone including but not limited to hemolytic anemia, agranulocytosis, rashes, methemoglobinemia, kidney failure, peripheral neuropathy, headaches, GI upset, and liver toxicity.  Patients who start dapsone require monitoring including baseline LFTs and weekly CBCs for the first month, then every month thereafter.  The patient verbalized understanding of the proper use and possible adverse effects of dapsone.  All of the patient's questions and concerns were addressed.
Solaraze Counseling:  I discussed with the patient the risks of Solaraze including but not limited to erythema, scaling, itching, weeping, crusting, and pain.
Erythromycin Pregnancy And Lactation Text: This medication is Pregnancy Category B and is considered safe during pregnancy. It is also excreted in breast milk.
Skyrizi Counseling: I discussed with the patient the risks of risankizumab-rzaa including but not limited to immunosuppression, and serious infections.  The patient understands that monitoring is required including a PPD at baseline and must alert us or the primary physician if symptoms of infection or other concerning signs are noted.
Sski Pregnancy And Lactation Text: This medication is Pregnancy Category D and isn't considered safe during pregnancy. It is excreted in breast milk.
Saxenda Counseling: I reviewed the possible side effects including: thyroid tumors, kidney disease, gallbladder disease, abdominal pain, constipation, diarrhea, nausea, vomiting and pancreatitis. Do not take this medication if you have a history or family history of multiple endocrine neoplasia syndrome type 2. Side effects reviewed, pt to contact office should one occur.
Otezla Pregnancy And Lactation Text: This medication is Pregnancy Category C and it isn't known if it is safe during pregnancy. It is unknown if it is excreted in breast milk.
Humira Counseling:  I discussed with the patient the risks of adalimumab including but not limited to myelosuppression, immunosuppression, autoimmune hepatitis, demyelinating diseases, lymphoma, and serious infections.  The patient understands that monitoring is required including a PPD at baseline and must alert us or the primary physician if symptoms of infection or other concerning signs are noted.
Acitretin Pregnancy And Lactation Text: This medication is Pregnancy Category X and should not be given to women who are pregnant or may become pregnant in the future. This medication is excreted in breast milk.
Litfulo Counseling: I discussed with the patient the risks of Litfulo therapy including but not limited to upper respiratory tract infections, shingles, cold sores, and nausea. Live vaccines should be avoided.  This medication has been linked to serious infections; higher rate of mortality; malignancy and lymphoproliferative disorders; major adverse cardiovascular events; thrombosis; gastrointestinal perforations; neutropenia; lymphopenia; anemia; liver enzyme elevations; and lipid elevations.
Dutasteride Male Counseling: Dustasteride Counseling:  I discussed with the patient the risks of use of dutasteride including but not limited to decreased libido, decreased ejaculate volume, and gynecomastia. Women who can become pregnant should not handle medication.  All of the patient's questions and concerns were addressed.
Cantharidin Counseling:  I discussed with the patient the risks of Cantharidin including but not limited to pain, redness, burning, itching, and blistering.
Bexarotene Counseling:  I discussed with the patient the risks of bexarotene including but not limited to hair loss, dry lips/skin/eyes, liver abnormalities, hyperlipidemia, pancreatitis, depression/suicidal ideation, photosensitivity, drug rash/allergic reactions, hypothyroidism, anemia, leukopenia, infection, cataracts, and teratogenicity.  Patient understands that they will need regular blood tests to check lipid profile, liver function tests, white blood cell count, thyroid function tests and pregnancy test if applicable.
Litfulo Pregnancy And Lactation Text: Based on animal studies, Lifulo may cause embryo-fetal harm when administered to pregnant women.  The medication should not be used in pregnancy.  Breastfeeding is not recommended during treatment.
Opioid Counseling: I discussed with the patient the potential side effects of opioids including but not limited to addiction, altered mental status, and depression. I stressed avoiding alcohol, benzodiazepines, muscle relaxants and sleep aids unless specifically okayed by a physician. The patient verbalized understanding of the proper use and possible adverse effects of opioids. All of the patient's questions and concerns were addressed. They were instructed to flush the remaining pills down the toilet if they did not need them for pain.
Thalidomide Counseling: I discussed with the patient the risks of thalidomide including but not limited to birth defects, anxiety, weakness, chest pain, dizziness, cough and severe allergy.
Metronidazole Counseling:  I discussed with the patient the risks of metronidazole including but not limited to seizures, nausea/vomiting, a metallic taste in the mouth, nausea/vomiting and severe allergy.
Bimzelx Pregnancy And Lactation Text: This medication crosses the placenta and the safety is uncertain during pregnancy. It is unknown if this medication is present in breast milk.
Dapsone Pregnancy And Lactation Text: This medication is Pregnancy Category C and is not considered safe during pregnancy or breast feeding.
Solaraze Pregnancy And Lactation Text: This medication is Pregnancy Category B and is considered safe. There is some data to suggest avoiding during the third trimester. It is unknown if this medication is excreted in breast milk.
Opzelura Counseling:  I discussed with the patient the risks of Opzelura including but not limited to nasopharngitis, bronchitis, ear infection, eosinophila, hives, diarrhea, folliculitis, tonsillitis, and rhinorrhea.  Taken orally, this medication has been linked to serious infections; higher rate of mortality; malignancy and lymphoproliferative disorders; major adverse cardiovascular events; thrombosis; thrombocytopenia, anemia, and neutropenia; and lipid elevations.
Dutasteride Female Counseling: Dutasteride Counseling:  I discussed with the patient the risks of use of dutasteride including but not limited to decreased libido and sexual dysfunction. Explained the teratogenic nature of the medication and stressed the importance of not getting pregnant during treatment. All of the patient's questions and concerns were addressed.
Oxybutynin Counseling:  I discussed with the patient the risks of oxybutynin including but not limited to skin rash, drowsiness, dry mouth, difficulty urinating, and blurred vision.
Winlevi Counseling:  I discussed with the patient the risks of topical clascoterone including but not limited to erythema, scaling, itching, and stinging. Patient voiced their understanding.
Metronidazole Pregnancy And Lactation Text: This medication is Pregnancy Category B and considered safe during pregnancy.  It is also excreted in breast milk.
Cimzia Counseling:  I discussed with the patient the risks of Cimzia including but not limited to immunosuppression, allergic reactions and infections.  The patient understands that monitoring is required including a PPD at baseline and must alert us or the primary physician if symptoms of infection or other concerning signs are noted.
Spevigo Counseling: I discussed with the patient the risks of Spevigo including but not limited to fatigue, nasuea, vomiting, headache, pruritus, urinary tract infection, an infusion related reactions.  The patient understands that monitoring is required including screening for tuberculosis at baseline and yearly screening thereafter while continuing Spevigo therapy. They should contact us if symptoms of infection or other concerning signs are noted.
Azathioprine Counseling:  I discussed with the patient the risks of azathioprine including but not limited to myelosuppression, immunosuppression, hepatotoxicity, lymphoma, and infections.  The patient understands that monitoring is required including baseline LFTs, Creatinine, possible TPMP genotyping and weekly CBCs for the first month and then every 2 weeks thereafter.  The patient verbalized understanding of the proper use and possible adverse effects of azathioprine.  All of the patient's questions and concerns were addressed.
Opioid Pregnancy And Lactation Text: These medications can lead to premature delivery and should be avoided during pregnancy. These medications are also present in breast milk in small amounts.
Olumiant Counseling: I discussed with the patient the risks of Olumiant therapy including but not limited to upper respiratory tract infections, shingles, cold sores, and nausea. Live vaccines should be avoided.  This medication has been linked to serious infections; higher rate of mortality; malignancy and lymphoproliferative disorders; major adverse cardiovascular events; thrombosis; gastrointestinal perforations; neutropenia; lymphopenia; anemia; liver enzyme elevations; and lipid elevations.
Soolantra Counseling: I discussed with the patients the risks of topial Soolantra. This is a medicine which decreases the number of mites and inflammation in the skin. You experience burning, stinging, eye irritation or allergic reactions.  Please call our office if you develop any problems from using this medication.
Gabapentin Counseling: I discussed with the patient the risks of gabapentin including but not limited to dizziness, somnolence, fatigue and ataxia.
Semaglutide Counseling: I reviewed the possible side effects including: thyroid tumors, kidney disease, gallbladder disease, abdominal pain, constipation, diarrhea, nausea, vomiting and pancreatitis. Do not take this medication if you have a history or family history of multiple endocrine neoplasia syndrome type 2. Side effects reviewed, pt to contact office should one occur.
Hyrimoz Counseling:  I discussed with the patient the risks of adalimumab including but not limited to myelosuppression, immunosuppression, autoimmune hepatitis, demyelinating diseases, lymphoma, and serious infections.  The patient understands that monitoring is required including a PPD at baseline and must alert us or the primary physician if symptoms of infection or other concerning signs are noted.
Winlevi Pregnancy And Lactation Text: This medication is considered safe during pregnancy and breastfeeding.
5-Fu Counseling: 5-Fluorouracil Counseling:  I discussed with the patient the risks of 5-fluorouracil including but not limited to erythema, scaling, itching, weeping, crusting, and pain.
Bexarotene Pregnancy And Lactation Text: This medication is Pregnancy Category X and should not be given to women who are pregnant or may become pregnant. This medication should not be used if you are breast feeding.
Imiquimod Counseling:  I discussed with the patient the risks of imiquimod including but not limited to erythema, scaling, itching, weeping, crusting, and pain.  Patient understands that the inflammatory response to imiquimod is variable from person to person and was educated regarded proper titration schedule.  If flu-like symptoms develop, patient knows to discontinue the medication and contact us.
Nsaids Counseling: NSAID Counseling: I discussed with the patient that NSAIDs should be taken with food. Prolonged use of NSAIDs can result in the development of stomach ulcers.  Patient advised to stop taking NSAIDs if abdominal pain occurs.  The patient verbalized understanding of the proper use and possible adverse effects of NSAIDs.  All of the patient's questions and concerns were addressed.
Terbinafine Counseling: Patient counseling regarding adverse effects of terbinafine including but not limited to headache, diarrhea, rash, upset stomach, liver function test abnormalities, itching, taste/smell disturbance, nausea, abdominal pain, and flatulence.  There is a rare possibility of liver failure that can occur when taking terbinafine.  The patient understands that a baseline LFT and kidney function test may be required. The patient verbalized understanding of the proper use and possible adverse effects of terbinafine.  All of the patient's questions and concerns were addressed.
Cephalexin Pregnancy And Lactation Text: This medication is Pregnancy Category B and considered safe during pregnancy.  It is also excreted in breast milk but can be used safely for shorter doses.
Prednisone Counseling:  I discussed with the patient the risks of prolonged use of prednisone including but not limited to weight gain, insomnia, osteoporosis, mood changes, diabetes, susceptibility to infection, glaucoma and high blood pressure.  In cases where prednisone use is prolonged, patients should be monitored with blood pressure checks, serum glucose levels and an eye exam.  Additionally, the patient may need to be placed on GI prophylaxis, PCP prophylaxis, and calcium and vitamin D supplementation and/or a bisphosphonate.  The patient verbalized understanding of the proper use and the possible adverse effects of prednisone.  All of the patient's questions and concerns were addressed.
Topical Metronidazole Counseling: Metronidazole is a topical antibiotic medication. You may experience burning, stinging, redness, or allergic reactions.  Please call our office if you develop any problems from using this medication.
Siliq Counseling:  I discussed with the patient the risks of Siliq including but not limited to new or worsening depression, suicidal thoughts and behavior, immunosuppression, malignancy, posterior leukoencephalopathy syndrome, and serious infections.  The patient understands that monitoring is required including a PPD at baseline and must alert us or the primary physician if symptoms of infection or other concerning signs are noted. There is also a special program designed to monitor depression which is required with Siliq.
Arava Counseling:  Patient counseled regarding adverse effects of Arava including but not limited to nausea, vomiting, abnormalities in liver function tests. Patients may develop mouth sores, rash, diarrhea, and abnormalities in blood counts. The patient understands that monitoring is required including LFTs and blood counts.  There is a rare possibility of scarring of the liver and lung problems that can occur when taking methotrexate. Persistent nausea, loss of appetite, pale stools, dark urine, cough, and shortness of breath should be reported immediately. Patient advised to discontinue Arava treatment and consult with a physician prior to attempting conception. The patient will have to undergo a treatment to eliminate Arava from the body prior to conception.
Clindamycin Counseling: I counseled the patient regarding use of clindamycin as an antibiotic for prophylactic and/or therapeutic purposes. Clindamycin is active against numerous classes of bacteria, including skin bacteria. Side effects may include nausea, diarrhea, gastrointestinal upset, rash, hives, yeast infections, and in rare cases, colitis.
Topical Metronidazole Pregnancy And Lactation Text: This medication is Pregnancy Category B and considered safe during pregnancy.  It is also considered safe to use while breastfeeding.
Nsaids Pregnancy And Lactation Text: These medications are considered safe up to 30 weeks gestation. It is excreted in breast milk.
Protopic Pregnancy And Lactation Text: This medication is Pregnancy Category C. It is unknown if this medication is excreted in breast milk when applied topically.
Olanzapine Counseling- I discussed with the patient the common side effects of olanzapine including but are not limited to: lack of energy, dry mouth, increased appetite, sleepiness, tremor, constipation, dizziness, changes in behavior, or restlessness.  Explained that teenagers are more likely to experience headaches, abdominal pain, pain in the arms or legs, tiredness, and sleepiness.  Serious side effects include but are not limited: increased risk of death in elderly patients who are confused, have memory loss, or dementia-related psychosis; hyperglycemia; increased cholesterol and triglycerides; and weight gain.
Topical Steroids Counseling: I discussed with the patient that prolonged use of topical steroids can result in the increased appearance of superficial blood vessels (telangiectasias), lightening (hypopigmentation) and thinning of the skin (atrophy).  Patient understands to avoid using high potency steroids in skin folds, the groin or the face.  The patient verbalized understanding of the proper use and possible adverse effects of topical steroids.  All of the patient's questions and concerns were addressed.
Benzoyl Peroxide Counseling: Patient counseled that medicine may cause skin irritation and bleach clothing.  In the event of skin irritation, the patient was advised to reduce the amount of the drug applied or use it less frequently.   The patient verbalized understanding of the proper use and possible adverse effects of benzoyl peroxide.  All of the patient's questions and concerns were addressed.
Clindamycin Pregnancy And Lactation Text: This medication can be used in pregnancy if certain situations. Clindamycin is also present in breast milk.
Benzoyl Peroxide Pregnancy And Lactation Text: This medication is Pregnancy Category C. It is unknown if benzoyl peroxide is excreted in breast milk.
Qbrexza Counseling:  I discussed with the patient the risks of Qbrexza including but not limited to headache, mydriasis, blurred vision, dry eyes, nasal dryness, dry mouth, dry throat, dry skin, urinary hesitation, and constipation.  Local skin reactions including erythema, burning, stinging, and itching can also occur.
Clofazimine Counseling:  I discussed with the patient the risks of clofazimine including but not limited to skin and eye pigmentation, liver damage, nausea/vomiting, gastrointestinal bleeding and allergy.
Simlandi Counseling:  I discussed with the patient the risks of adalimumab including but not limited to myelosuppression, immunosuppression, autoimmune hepatitis, demyelinating diseases, lymphoma, and serious infections.  The patient understands that monitoring is required including a PPD at baseline and must alert us or the primary physician if symptoms of infection or other concerning signs are noted.
Klisyri Counseling:  I discussed with the patient the risks of Klisyri including but not limited to erythema, scaling, itching, weeping, crusting, and pain.
Klisyri Pregnancy And Lactation Text: It is unknown if this medication can harm a developing fetus or if it is excreted in breast milk.
Olanzapine Pregnancy And Lactation Text: This medication is pregnancy category C.   There are no adequate and well controlled trials with olanzapine in pregnant females.  Olanzapine should be used during pregnancy only if the potential benefit justifies the potential risk to the fetus.   In a study in lactating healthy women, olanzapine was excreted in breast milk.  It is recommended that women taking olanzapine should not breast feed.
Topical Steroids Applications Pregnancy And Lactation Text: Most topical steroids are considered safe to use during pregnancy and lactation.  Any topical steroid applied to the breast or nipple should be washed off before breastfeeding.
Carac Counseling:  I discussed with the patient the risks of Carac including but not limited to erythema, scaling, itching, weeping, crusting, and pain.
Propranolol Counseling:  I discussed with the patient the risks of propranolol including but not limited to low heart rate, low blood pressure, low blood sugar, restlessness and increased cold sensitivity. They should call the office if they experience any of these side effects.
Doxycycline Counseling:  Patient counseled regarding possible photosensitivity and increased risk for sunburn.  Patient instructed to avoid sunlight, if possible.  When exposed to sunlight, patients should wear protective clothing, sunglasses, and sunscreen.  The patient was instructed to call the office immediately if the following severe adverse effects occur:  hearing changes, easy bruising/bleeding, severe headache, or vision changes.  The patient verbalized understanding of the proper use and possible adverse effects of doxycycline.  All of the patient's questions and concerns were addressed.
Qbrexza Pregnancy And Lactation Text: There is no available data on Qbrexza use in pregnant women.  There is no available data on Qbrexza use in lactation.
Minoxidil Counseling: Minoxidil is a topical medication which can increase blood flow where it is applied. It is uncertain how this medication increases hair growth. Side effects are uncommon and include stinging and allergic reactions.
Oral Minoxidil Counseling- I discussed with the patient the risks of oral minoxidil including but not limited to shortness of breath, swelling of the feet or ankles, dizziness, lightheadedness, unwanted hair growth and allergic reaction.  The patient verbalized understanding of the proper use and possible adverse effects of oral minoxidil.  All of the patient's questions and concerns were addressed.
Propranolol Pregnancy And Lactation Text: This medication is Pregnancy Category C and it isn't known if it is safe during pregnancy. It is excreted in breast milk.
Doxycycline Pregnancy And Lactation Text: This medication is Pregnancy Category D and not consider safe during pregnancy. It is also excreted in breast milk but is considered safe for shorter treatment courses.
Adbry Counseling: I discussed with the patient the risks of tralokinumab including but not limited to eye infection and irritation, cold sores, injection site reactions, worsening of asthma, allergic reactions and increased risk of parasitic infection.  Live vaccines should be avoided while taking tralokinumab. The patient understands that monitoring is required and they must alert us or the primary physician if symptoms of infection or other concerning signs are noted.
Simponi Counseling:  I discussed with the patient the risks of golimumab including but not limited to myelosuppression, immunosuppression, autoimmune hepatitis, demyelinating diseases, lymphoma, and serious infections.  The patient understands that monitoring is required including a PPD at baseline and must alert us or the primary physician if symptoms of infection or other concerning signs are noted.
Topical Sulfur Applications Counseling: Topical Sulfur Counseling: Patient counseled that this medication may cause skin irritation or allergic reactions.  In the event of skin irritation, the patient was advised to reduce the amount of the drug applied or use it less frequently.   The patient verbalized understanding of the proper use and possible adverse effects of topical sulfur application.  All of the patient's questions and concerns were addressed.